# Patient Record
Sex: FEMALE | Race: WHITE | NOT HISPANIC OR LATINO | Employment: FULL TIME | URBAN - METROPOLITAN AREA
[De-identification: names, ages, dates, MRNs, and addresses within clinical notes are randomized per-mention and may not be internally consistent; named-entity substitution may affect disease eponyms.]

---
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
## 2018-09-17 RX ORDER — NORETHINDRONE ACETATE AND ETHINYL ESTRADIOL 1MG-20(24)
KIT ORAL
Qty: 28 TABLET | Refills: 5 | Status: SHIPPED | OUTPATIENT
Start: 2018-09-17 | End: 2018-12-13 | Stop reason: SDUPTHER

## 2018-12-14 RX ORDER — NORETHINDRONE ACETATE AND ETHINYL ESTRADIOL 1MG-20(24)
KIT ORAL
Qty: 28 TABLET | Refills: 3 | Status: SHIPPED | OUTPATIENT
Start: 2018-12-14 | End: 2019-03-05 | Stop reason: SDUPTHER

## 2019-03-06 RX ORDER — NORETHINDRONE ACETATE AND ETHINYL ESTRADIOL 1MG-20(24)
KIT ORAL
Qty: 28 TABLET | Refills: 0 | Status: SHIPPED | OUTPATIENT
Start: 2019-03-06 | End: 2019-03-31 | Stop reason: SDUPTHER

## 2019-04-01 RX ORDER — NORETHINDRONE ACETATE AND ETHINYL ESTRADIOL 1MG-20(24)
KIT ORAL
Qty: 28 TABLET | Refills: 0 | Status: SHIPPED | OUTPATIENT
Start: 2019-04-01 | End: 2019-04-25 | Stop reason: SDUPTHER

## 2019-04-25 RX ORDER — NORETHINDRONE ACETATE AND ETHINYL ESTRADIOL 1MG-20(24)
KIT ORAL
Qty: 28 TABLET | Refills: 0 | Status: SHIPPED | OUTPATIENT
Start: 2019-04-25 | End: 2019-05-15 | Stop reason: SDUPTHER

## 2019-05-16 RX ORDER — NORETHINDRONE ACETATE AND ETHINYL ESTRADIOL 1MG-20(24)
KIT ORAL
Qty: 28 TABLET | Refills: 0 | Status: SHIPPED | OUTPATIENT
Start: 2019-05-16 | End: 2019-05-30 | Stop reason: SDUPTHER

## 2019-05-30 ENCOUNTER — OFFICE VISIT (OUTPATIENT)
Dept: OBSTETRICS AND GYNECOLOGY | Facility: CLINIC | Age: 19
End: 2019-05-30
Attending: STUDENT IN AN ORGANIZED HEALTH CARE EDUCATION/TRAINING PROGRAM
Payer: COMMERCIAL

## 2019-05-30 VITALS
BODY MASS INDEX: 18.28 KG/M2 | SYSTOLIC BLOOD PRESSURE: 110 MMHG | WEIGHT: 113.75 LBS | HEIGHT: 66 IN | DIASTOLIC BLOOD PRESSURE: 66 MMHG

## 2019-05-30 DIAGNOSIS — Z00.129 ENCOUNTER FOR WELL ADOLESCENT VISIT: Primary | ICD-10-CM

## 2019-05-30 PROCEDURE — 3008F BODY MASS INDEX DOCD: CPT | Mod: CPTII,S$GLB,, | Performed by: STUDENT IN AN ORGANIZED HEALTH CARE EDUCATION/TRAINING PROGRAM

## 2019-05-30 PROCEDURE — 99395 PR PREVENTIVE VISIT,EST,18-39: ICD-10-PCS | Mod: S$GLB,,, | Performed by: STUDENT IN AN ORGANIZED HEALTH CARE EDUCATION/TRAINING PROGRAM

## 2019-05-30 PROCEDURE — 99999 PR PBB SHADOW E&M-EST. PATIENT-LVL III: ICD-10-PCS | Mod: PBBFAC,,, | Performed by: STUDENT IN AN ORGANIZED HEALTH CARE EDUCATION/TRAINING PROGRAM

## 2019-05-30 PROCEDURE — 3008F PR BODY MASS INDEX (BMI) DOCUMENTED: ICD-10-PCS | Mod: CPTII,S$GLB,, | Performed by: STUDENT IN AN ORGANIZED HEALTH CARE EDUCATION/TRAINING PROGRAM

## 2019-05-30 PROCEDURE — 99395 PREV VISIT EST AGE 18-39: CPT | Mod: S$GLB,,, | Performed by: STUDENT IN AN ORGANIZED HEALTH CARE EDUCATION/TRAINING PROGRAM

## 2019-05-30 PROCEDURE — 99999 PR PBB SHADOW E&M-EST. PATIENT-LVL III: CPT | Mod: PBBFAC,,, | Performed by: STUDENT IN AN ORGANIZED HEALTH CARE EDUCATION/TRAINING PROGRAM

## 2019-05-30 NOTE — PROGRESS NOTES
Chief Complaint: Adolescent visit     HPI:      Simi Reaves 18 y.o.  presents to establish care.  Patient's last menstrual period was 2019 (approximate). Patient reports menses are regular. She denies heavy, painful periods.  Started on OCPs last year for dysmenorrhea and reports much improvement.  No side effects.  No other issues or concerns.  She uses tampons. She has never been sexually active. She is currently going to school - just finished marta year at [employer].     OB History        0    Para   0    Term   0       0    AB   0    Living   0       SAB   0    TAB   0    Ectopic   0    Multiple   0    Live Births   0               History reviewed. No pertinent past medical history.  Past Surgical History:   Procedure Laterality Date    TONSILLECTOMY       Social History     Socioeconomic History    Marital status: Single     Spouse name: Not on file    Number of children: Not on file    Years of education: Not on file    Highest education level: Not on file   Occupational History    Not on file   Social Needs    Financial resource strain: Not on file    Food insecurity:     Worry: Not on file     Inability: Not on file    Transportation needs:     Medical: Not on file     Non-medical: Not on file   Tobacco Use    Smoking status: Never Smoker    Smokeless tobacco: Never Used   Substance and Sexual Activity    Alcohol use: Yes     Comment: rarely    Drug use: Never    Sexual activity: Never   Lifestyle    Physical activity:     Days per week: Not on file     Minutes per session: Not on file    Stress: Not on file   Relationships    Social connections:     Talks on phone: Not on file     Gets together: Not on file     Attends Sabianist service: Not on file     Active member of club or organization: Not on file     Attends meetings of clubs or organizations: Not on file     Relationship status: Not on file   Other Topics Concern    Not on file   Social History  "Narrative    Not on file     Family History   Problem Relation Age of Onset    Colon cancer Neg Hx     Ovarian cancer Neg Hx     Breast cancer Neg Hx        Current Outpatient Medications:     BLISOVI 24 FE 1 mg-20 mcg (24)/75 mg (4) per tablet, TAKE 1 TABLET BY MOUTH ONCE DAILY., Disp: 28 tablet, Rfl: 0    ROS:     GENERAL: Denies unintentional weight gain or weight loss. Feeling well overall.   SKIN: Denies rash or lesions.   HEENT: Denies headaches, or vision changes.   CARDIOVASCULAR: Denies palpitations or chest pain.   RESPIRATORY: Denies shortness of breath or dyspnea on exertion.  BREASTS: Denies pain, lumps, or nipple discharge.   ABDOMEN: Denies abdominal pain, constipation, diarrhea, nausea, vomiting, change in appetite.  URINARY: Denies frequency, dysuria, hematuria.  NEUROLOGIC: Denies syncope or weakness.   PSYCHIATRIC: Denies depression, anxiety or mood swings.    Physical Exam:      /66   Ht 5' 6" (1.676 m)   Wt 51.6 kg (113 lb 12.1 oz)   LMP 05/01/2019 (Approximate)   BMI 18.36 kg/m²   Body mass index is 18.36 kg/m².    APPEARANCE: Well nourished, well developed, in no acute distress.  PSYCH: Appropriate mood and affect  EXTREMITIES: No edema.     Assessment/Plan:     Encounter for well adolescent visit    Other orders  -     norethindrone-e.estradiol-iron (BLISOVI 24 FE) 1 mg-20 mcg (24)/75 mg (4) Oral per tablet; Take 1 tablet by mouth once daily.  Dispense: 28 tablet; Refill: 11        Counseling:     Normal female anatomy and normal anatomy variations discussed at length.   Normal sexuality discussed.   Condoms as a method of protection against STDs and appropriate condom use were discussed.    The risks of, benefits of, and alternatives of various forms of contraception were discussed at this visit. After a discussion of the R/B/A of fertility awareness, barrier contraception, hormonal pills, injections, patches, rings, hormonal and non-hormonal IUDs, and the subdermal implant, " all of Simi Reaves's questions were answered. Plan B for emergency contraception was also reviewed.     She would like to continue OCPs for dysmenorrhea.  R/B/A reviewed and all questions answered.  Discussed pap smear and exam at next visit.  All questions answered.  RTC in 1 year or sooner if needed.     15 minutes of face to face counseling occurred during today's visit.

## 2019-09-02 PROBLEM — Z00.129 ENCOUNTER FOR WELL ADOLESCENT VISIT: Status: RESOLVED | Noted: 2019-05-30 | Resolved: 2019-09-02

## 2020-05-13 ENCOUNTER — LAB VISIT (OUTPATIENT)
Dept: PRIMARY CARE CLINIC | Facility: CLINIC | Age: 20
End: 2020-05-13
Payer: COMMERCIAL

## 2020-05-13 DIAGNOSIS — Z00.6 RESEARCH STUDY PATIENT: Primary | ICD-10-CM

## 2020-05-13 PROCEDURE — U0002 COVID-19 LAB TEST NON-CDC: HCPCS

## 2020-05-13 PROCEDURE — 86769 SARS-COV-2 COVID-19 ANTIBODY: CPT

## 2020-05-13 RX ORDER — NORETHINDRONE ACETATE AND ETHINYL ESTRADIOL 1MG-20(24)
KIT ORAL
Qty: 84 TABLET | Refills: 0 | Status: SHIPPED | OUTPATIENT
Start: 2020-05-13 | End: 2020-08-14 | Stop reason: SDUPTHER

## 2020-05-13 NOTE — RESEARCH
Date of Consent: 05/13/2020     Sponsor: [facility]    Study Title/IRB Number: Observational study of Sars-CoV2 Immunoglobulin G (IgG) seroprevalence among the [facility] population over time 2020.163  Principle Investigator: Kady Pat, PhD    Did the patient need translation services? No   name: N/A    Prior to the Informed Consent (IC) being signed, or any study protocol required data collection, testing, procedure, or intervention being performed, the following was done and/or discussed:   Patient was given a paper copy of the IC for review    Patient was given study FAQ   Purpose of the study and qualifications to participate    Study design and tests or procedures done at this visit   Confidentiality and HIPAA Authorization for Release of Medical Records for the research trial/ subject's rights/research related injury   Risk, Benefits, Alternative Treatments, Compensation and Costs   Participation in the research trial is voluntary and patient may withdraw at anytime   Contact information for study related questions    Patient verbalizes understanding of the above: Yes  Contact information for PI and IRB given to patient: Yes  Patient able to adequately summarize: the purpose of the study, the risks associated with the study, and all procedures, testing, and follow-ups associated with the study: Yes    The consent was discussed verbally with the patient and all questions were answered satisfactorily. Patient gave verbal consent for the Seroprevalence research study with an IRB approval date of 05/08/2020.      The Consent, Consent Witness and name of Clinical Research Coordinator consenting was captured and documented in [employer].    All Inclusion and Exclusion Criteria reviewed, subject meets all Inclusion criteria and does not meet any Exclusion Criteria at this time.     Patient Eligibility was confirmed.    Patient responded to survey questions.    The following biospecimen  collection procedures were collected:    -Nasopharyngeal Swab Collection  -Blood collection

## 2020-05-14 LAB — SARS-COV-2 IGG SERPLBLD QL IA.RAPID: NEGATIVE

## 2020-05-15 LAB — SARS-COV-2 RNA RESP QL NAA+PROBE: NOT DETECTED

## 2020-10-12 ENCOUNTER — OFFICE VISIT (OUTPATIENT)
Dept: OBSTETRICS AND GYNECOLOGY | Facility: CLINIC | Age: 20
End: 2020-10-12
Attending: STUDENT IN AN ORGANIZED HEALTH CARE EDUCATION/TRAINING PROGRAM
Payer: COMMERCIAL

## 2020-10-12 VITALS
HEIGHT: 66 IN | SYSTOLIC BLOOD PRESSURE: 122 MMHG | BODY MASS INDEX: 18.58 KG/M2 | DIASTOLIC BLOOD PRESSURE: 72 MMHG | WEIGHT: 115.63 LBS

## 2020-10-12 DIAGNOSIS — Z01.419 WELL WOMAN EXAM: ICD-10-CM

## 2020-10-12 DIAGNOSIS — Z12.4 SCREENING FOR CERVICAL CANCER: Primary | ICD-10-CM

## 2020-10-12 PROCEDURE — 88175 CYTOPATH C/V AUTO FLUID REDO: CPT

## 2020-10-12 PROCEDURE — 99999 PR PBB SHADOW E&M-EST. PATIENT-LVL III: ICD-10-PCS | Mod: PBBFAC,,, | Performed by: STUDENT IN AN ORGANIZED HEALTH CARE EDUCATION/TRAINING PROGRAM

## 2020-10-12 PROCEDURE — 99999 PR PBB SHADOW E&M-EST. PATIENT-LVL III: CPT | Mod: PBBFAC,,, | Performed by: STUDENT IN AN ORGANIZED HEALTH CARE EDUCATION/TRAINING PROGRAM

## 2020-10-12 PROCEDURE — 99395 PR PREVENTIVE VISIT,EST,18-39: ICD-10-PCS | Mod: S$GLB,,, | Performed by: STUDENT IN AN ORGANIZED HEALTH CARE EDUCATION/TRAINING PROGRAM

## 2020-10-12 PROCEDURE — 99395 PREV VISIT EST AGE 18-39: CPT | Mod: S$GLB,,, | Performed by: STUDENT IN AN ORGANIZED HEALTH CARE EDUCATION/TRAINING PROGRAM

## 2020-10-12 RX ORDER — NORETHINDRONE ACETATE AND ETHINYL ESTRADIOL 1MG-20(24)
1 KIT ORAL DAILY
Qty: 84 TABLET | Refills: 3 | Status: SHIPPED | OUTPATIENT
Start: 2020-10-12 | End: 2021-10-26

## 2020-10-12 NOTE — PROGRESS NOTES
Chief Complaint: Well Woman Exam     HPI:      Simi Reaves is a 20 y.o.  who presents today for well woman exam.  LMP: Patient's last menstrual period was 2020 (approximate).  No issues, problems, or complaints. Specifically, patient denies abnormal vaginal bleeding, discharge, pelvic pain, urinary problems, or changes in appetite. Ms. Reaves is currently sexually active with a single male partner. She is currently using oral contraceptives (estrogen/progesterone) for contraception. She declines STD screening today.    Previous Pap: No result found     OB History        0    Para   0    Term   0       0    AB   0    Living   0       SAB   0    TAB   0    Ectopic   0    Multiple   0    Live Births   0           Obstetric Comments   Menarche age 10           History reviewed. No pertinent past medical history.  Past Surgical History:   Procedure Laterality Date    LASIK  2020    TONSILLECTOMY       Social History     Socioeconomic History    Marital status: Single     Spouse name: Not on file    Number of children: Not on file    Years of education: Not on file    Highest education level: Not on file   Occupational History    Not on file   Social Needs    Financial resource strain: Not on file    Food insecurity     Worry: Not on file     Inability: Not on file    Transportation needs     Medical: Not on file     Non-medical: Not on file   Tobacco Use    Smoking status: Never Smoker    Smokeless tobacco: Never Used   Substance and Sexual Activity    Alcohol use: Yes     Comment: rarely    Drug use: Never    Sexual activity: Not Currently     Birth control/protection: OCP   Lifestyle    Physical activity     Days per week: Not on file     Minutes per session: Not on file    Stress: Not on file   Relationships    Social connections     Talks on phone: Not on file     Gets together: Not on file     Attends Confucianism service: Not on file     Active member  "of club or organization: Not on file     Attends meetings of clubs or organizations: Not on file     Relationship status: Not on file   Other Topics Concern    Not on file   Social History Narrative    Not on file     Family History   Problem Relation Age of Onset    Colon cancer Neg Hx     Ovarian cancer Neg Hx     Breast cancer Neg Hx        Current Outpatient Medications:     norethindrone-e.estradioL-iron (BLISOVI 24 FE) 1 mg-20 mcg (24)/75 mg (4) per tablet, Take 1 tablet by mouth once daily., Disp: 84 tablet, Rfl: 3    ROS:     GENERAL: Denies unintentional weight gain or weight loss. Feeling well overall.   SKIN: Denies rash or lesions.   HEENT: Denies headaches, or vision changes.   CARDIOVASCULAR: Denies palpitations or chest pain.   RESPIRATORY: Denies shortness of breath or dyspnea on exertion.  BREASTS: Denies pain, lumps, or nipple discharge.   ABDOMEN: Denies abdominal pain, constipation, diarrhea, nausea, vomiting, change in appetite.  URINARY: Denies frequency, dysuria, hematuria.  NEUROLOGIC: Denies syncope or weakness.   PSYCHIATRIC: Denies depression, anxiety or mood swings.    Physical Exam:      PHYSICAL EXAM:  /72   Ht 5' 6" (1.676 m)   Wt 52.4 kg (115 lb 10.1 oz)   LMP 09/28/2020 (Approximate)   BMI 18.66 kg/m²   Body mass index is 18.66 kg/m².     APPEARANCE: Well nourished, well developed, in no acute distress.  PSYCH: Appropriate mood and affect.  SKIN: No acne or hirsutism.  NECK: Neck symmetric without masses or thyromegaly.  NODES: No inguinal, axillary, or supraclavicular lymph node enlargement.  CHEST: Normal respiratory effort.    CARDIOVASCULAR:  Regular rate and rhythm.  BREASTS: Symmetrical, no skin changes or visible lesions.  No palpable masses or nipple discharge bilaterally.  ABDOMEN: Soft.  No tenderness or masses.    PELVIC: Normal external genitalia without lesions.  Normal hair distribution.  Adequate perineal body, normal urethral meatus.  Vagina moist and " well rugated without lesions or discharge.  Cervix pink, without lesions, discharge or tenderness.  No significant cystocele or rectocele.  Bimanual exam shows uterus to be normal size, regular, mobile and nontender.  Adnexa without masses or tenderness.    EXTREMITIES: No edema.  No tenderness to palpation.      Assessment/Plan:     20 y.o.     Screening for cervical cancer  -     Liquid-Based Pap Smear, Screening    Well woman exam    Other orders  -     norethindrone-e.estradioL-iron (BLISOVI 24 FE) 1 mg-20 mcg (24)/75 mg (4) per tablet; Take 1 tablet by mouth once daily.  Dispense: 84 tablet; Refill: 3          Counselin.  Annual exam performed today without difficulty.  Patient was counseled today on current ASCCP pap guidelines, the recommendation for yearly pelvic exams, healthy diet and exercise routines, breast self awareness.  Pap smear collected.  All questions answered.    2.  Contraception:  Desires to continue OCPs.  R/B/A reviewed including but not limited to risk of cramping, irregular bleeding, nausea, bloating, breast tenderness, headache, stroke, blood clot, heart attack.  Patient voiced understanding and desires to proceed with treatment.  3.  STD testing:  Declines.  4.  Follow up with PCP for other health maintenance.  5.  Follow up in about 1 year (around 10/12/2021).      Use of the [employer] [facility] Patient Portal discussed and encouraged during today's visit.

## 2020-11-06 ENCOUNTER — PATIENT MESSAGE (OUTPATIENT)
Dept: OBSTETRICS AND GYNECOLOGY | Facility: CLINIC | Age: 20
End: 2020-11-06

## 2020-11-06 LAB
FINAL PATHOLOGIC DIAGNOSIS: NORMAL
Lab: NORMAL

## 2021-10-26 RX ORDER — NORETHINDRONE ACETATE AND ETHINYL ESTRADIOL 1MG-20(24)
KIT ORAL
Qty: 84 TABLET | Refills: 0 | Status: SHIPPED | OUTPATIENT
Start: 2021-10-26 | End: 2022-01-20

## 2022-02-21 ENCOUNTER — OFFICE VISIT (OUTPATIENT)
Dept: OBSTETRICS AND GYNECOLOGY | Facility: CLINIC | Age: 22
End: 2022-02-21
Attending: STUDENT IN AN ORGANIZED HEALTH CARE EDUCATION/TRAINING PROGRAM
Payer: COMMERCIAL

## 2022-02-21 VITALS
DIASTOLIC BLOOD PRESSURE: 74 MMHG | SYSTOLIC BLOOD PRESSURE: 120 MMHG | HEIGHT: 66 IN | WEIGHT: 114 LBS | BODY MASS INDEX: 18.32 KG/M2

## 2022-02-21 DIAGNOSIS — Z01.419 WELL WOMAN EXAM: ICD-10-CM

## 2022-02-21 DIAGNOSIS — Z30.9 ENCOUNTER FOR CONTRACEPTIVE MANAGEMENT, UNSPECIFIED TYPE: Primary | ICD-10-CM

## 2022-02-21 LAB
B-HCG UR QL: NEGATIVE
CTP QC/QA: YES

## 2022-02-21 PROCEDURE — 81025 POCT URINE PREGNANCY: ICD-10-PCS | Mod: S$GLB,,, | Performed by: STUDENT IN AN ORGANIZED HEALTH CARE EDUCATION/TRAINING PROGRAM

## 2022-02-21 PROCEDURE — 99999 PR PBB SHADOW E&M-EST. PATIENT-LVL III: ICD-10-PCS | Mod: PBBFAC,,, | Performed by: STUDENT IN AN ORGANIZED HEALTH CARE EDUCATION/TRAINING PROGRAM

## 2022-02-21 PROCEDURE — 3074F PR MOST RECENT SYSTOLIC BLOOD PRESSURE < 130 MM HG: ICD-10-PCS | Mod: CPTII,S$GLB,, | Performed by: STUDENT IN AN ORGANIZED HEALTH CARE EDUCATION/TRAINING PROGRAM

## 2022-02-21 PROCEDURE — 99395 PREV VISIT EST AGE 18-39: CPT | Mod: S$GLB,,, | Performed by: STUDENT IN AN ORGANIZED HEALTH CARE EDUCATION/TRAINING PROGRAM

## 2022-02-21 PROCEDURE — 3078F DIAST BP <80 MM HG: CPT | Mod: CPTII,S$GLB,, | Performed by: STUDENT IN AN ORGANIZED HEALTH CARE EDUCATION/TRAINING PROGRAM

## 2022-02-21 PROCEDURE — 3074F SYST BP LT 130 MM HG: CPT | Mod: CPTII,S$GLB,, | Performed by: STUDENT IN AN ORGANIZED HEALTH CARE EDUCATION/TRAINING PROGRAM

## 2022-02-21 PROCEDURE — 99395 PR PREVENTIVE VISIT,EST,18-39: ICD-10-PCS | Mod: S$GLB,,, | Performed by: STUDENT IN AN ORGANIZED HEALTH CARE EDUCATION/TRAINING PROGRAM

## 2022-02-21 PROCEDURE — 3008F BODY MASS INDEX DOCD: CPT | Mod: CPTII,S$GLB,, | Performed by: STUDENT IN AN ORGANIZED HEALTH CARE EDUCATION/TRAINING PROGRAM

## 2022-02-21 PROCEDURE — 3078F PR MOST RECENT DIASTOLIC BLOOD PRESSURE < 80 MM HG: ICD-10-PCS | Mod: CPTII,S$GLB,, | Performed by: STUDENT IN AN ORGANIZED HEALTH CARE EDUCATION/TRAINING PROGRAM

## 2022-02-21 PROCEDURE — 1159F MED LIST DOCD IN RCRD: CPT | Mod: CPTII,S$GLB,, | Performed by: STUDENT IN AN ORGANIZED HEALTH CARE EDUCATION/TRAINING PROGRAM

## 2022-02-21 PROCEDURE — 81025 URINE PREGNANCY TEST: CPT | Mod: S$GLB,,, | Performed by: STUDENT IN AN ORGANIZED HEALTH CARE EDUCATION/TRAINING PROGRAM

## 2022-02-21 PROCEDURE — 1159F PR MEDICATION LIST DOCUMENTED IN MEDICAL RECORD: ICD-10-PCS | Mod: CPTII,S$GLB,, | Performed by: STUDENT IN AN ORGANIZED HEALTH CARE EDUCATION/TRAINING PROGRAM

## 2022-02-21 PROCEDURE — 3008F PR BODY MASS INDEX (BMI) DOCUMENTED: ICD-10-PCS | Mod: CPTII,S$GLB,, | Performed by: STUDENT IN AN ORGANIZED HEALTH CARE EDUCATION/TRAINING PROGRAM

## 2022-02-21 PROCEDURE — 99999 PR PBB SHADOW E&M-EST. PATIENT-LVL III: CPT | Mod: PBBFAC,,, | Performed by: STUDENT IN AN ORGANIZED HEALTH CARE EDUCATION/TRAINING PROGRAM

## 2022-02-28 RX ORDER — NORETHINDRONE ACETATE AND ETHINYL ESTRADIOL 1MG-20(24)
1 KIT ORAL DAILY
Qty: 84 TABLET | Refills: 4 | Status: SHIPPED | OUTPATIENT
Start: 2022-02-28 | End: 2023-05-05

## 2022-02-28 NOTE — PROGRESS NOTES
Chief Complaint: Well Woman Exam     HPI:      Simi Reaves is a 21 y.o.  who presents today for well woman exam.  LMP: Patient's last menstrual period was 2022.  No issues, problems, or complaints. Specifically, patient denies abnormal vaginal bleeding, discharge, pelvic pain, urinary problems, or changes in appetite. Ms. Reaves is currently sexually active with a single male partner. She is currently using oral contraceptives (estrogen/progesterone) for contraception. She declines STD screening today.    Previous Pap: 2020 NEM    OB History        0    Para   0    Term   0       0    AB   0    Living   0       SAB   0    IAB   0    Ectopic   0    Multiple   0    Live Births   0           Obstetric Comments   Menarche age 10           History reviewed. No pertinent past medical history.  Past Surgical History:   Procedure Laterality Date    LASIK  2020    TONSILLECTOMY       Social History     Socioeconomic History    Marital status: Single   Tobacco Use    Smoking status: Never Smoker    Smokeless tobacco: Never Used   Substance and Sexual Activity    Alcohol use: Yes     Comment: rarely    Drug use: Never    Sexual activity: Not Currently     Birth control/protection: OCP     Family History   Problem Relation Age of Onset    Colon cancer Neg Hx     Ovarian cancer Neg Hx     Breast cancer Neg Hx        Current Outpatient Medications:     norethindrone-e.estradioL-iron (BLISOVI 24 FE) 1 mg-20 mcg (24)/75 mg (4) per tablet, Take 1 tablet by mouth once daily., Disp: 84 tablet, Rfl: 4    ROS:     GENERAL: Denies unintentional weight gain or weight loss. Feeling well overall.   SKIN: Denies rash or lesions.   HEENT: Denies headaches, or vision changes.   CARDIOVASCULAR: Denies palpitations or chest pain.   RESPIRATORY: Denies shortness of breath or dyspnea on exertion.  BREASTS: Denies pain, lumps, or nipple discharge.   ABDOMEN: Denies abdominal pain,  "constipation, diarrhea, nausea, vomiting, change in appetite.  URINARY: Denies frequency, dysuria, hematuria.  NEUROLOGIC: Denies syncope or weakness.   PSYCHIATRIC: Denies depression, anxiety or mood swings.    Physical Exam:      PHYSICAL EXAM:  /74   Ht 5' 6" (1.676 m)   Wt 51.7 kg (113 lb 15.7 oz)   LMP 2022   BMI 18.40 kg/m²   Body mass index is 18.4 kg/m².     APPEARANCE: Well nourished, well developed, in no acute distress.  PSYCH: Appropriate mood and affect.  SKIN: No acne or hirsutism.  NECK: Neck symmetric without masses or thyromegaly.  NODES: No inguinal, axillary, or supraclavicular lymph node enlargement.  CHEST: Normal respiratory effort.    CARDIOVASCULAR:  Regular rate and rhythm.  BREASTS: Symmetrical, no skin changes or visible lesions.  No palpable masses or nipple discharge bilaterally.  ABDOMEN: Soft.  No tenderness or masses.    PELVIC: Normal external genitalia without lesions.  Normal hair distribution.  Adequate perineal body, normal urethral meatus.  Vagina moist and well rugated without lesions or discharge.  Cervix pink, without lesions, discharge or tenderness.  No significant cystocele or rectocele.  Bimanual exam shows uterus to be normal size, regular, mobile and nontender.  Adnexa without masses or tenderness.    EXTREMITIES: No edema.  No tenderness to palpation.      Assessment/Plan:     21 y.o.     Encounter for contraceptive management, unspecified type  -     POCT urine pregnancy    Well woman exam    Other orders  -     norethindrone-e.estradioL-iron (BLISOVI 24 FE) 1 mg-20 mcg (24)/75 mg (4) per tablet; Take 1 tablet by mouth once daily.  Dispense: 84 tablet; Refill: 4          Counselin.  Annual exam performed today without difficulty.  Patient was counseled today on current ASCCP pap guidelines, the recommendation for yearly pelvic exams, healthy diet and exercise routines, breast self awareness.  Pap smear collected.  All questions answered. "    2.  Contraception:  Desires to continue OCPs.  R/B/A reviewed including but not limited to risk of cramping, irregular bleeding, nausea, bloating, breast tenderness, headache, stroke, blood clot, heart attack.  Patient voiced understanding and desires to proceed with treatment.  3.  Follow up with PCP for other health maintenance.  4.  Follow up in about 1 year (around 2/21/2023).      Use of the [employer] Patient Portal discussed and encouraged during today's visit.

## 2022-10-11 ENCOUNTER — OFFICE VISIT (OUTPATIENT)
Dept: PRIMARY CARE CLINIC | Facility: CLINIC | Age: 22
End: 2022-10-11
Payer: COMMERCIAL

## 2022-10-11 DIAGNOSIS — R68.84 JAW PAIN: Primary | ICD-10-CM

## 2022-10-11 PROCEDURE — 1159F PR MEDICATION LIST DOCUMENTED IN MEDICAL RECORD: ICD-10-PCS | Mod: CPTII,95,, | Performed by: STUDENT IN AN ORGANIZED HEALTH CARE EDUCATION/TRAINING PROGRAM

## 2022-10-11 PROCEDURE — 1160F PR REVIEW ALL MEDS BY PRESCRIBER/CLIN PHARMACIST DOCUMENTED: ICD-10-PCS | Mod: CPTII,95,, | Performed by: STUDENT IN AN ORGANIZED HEALTH CARE EDUCATION/TRAINING PROGRAM

## 2022-10-11 PROCEDURE — 99213 PR OFFICE/OUTPT VISIT, EST, LEVL III, 20-29 MIN: ICD-10-PCS | Mod: 95,,, | Performed by: STUDENT IN AN ORGANIZED HEALTH CARE EDUCATION/TRAINING PROGRAM

## 2022-10-11 PROCEDURE — 99213 OFFICE O/P EST LOW 20 MIN: CPT | Mod: 95,,, | Performed by: STUDENT IN AN ORGANIZED HEALTH CARE EDUCATION/TRAINING PROGRAM

## 2022-10-11 PROCEDURE — 1159F MED LIST DOCD IN RCRD: CPT | Mod: CPTII,95,, | Performed by: STUDENT IN AN ORGANIZED HEALTH CARE EDUCATION/TRAINING PROGRAM

## 2022-10-11 PROCEDURE — 1160F RVW MEDS BY RX/DR IN RCRD: CPT | Mod: CPTII,95,, | Performed by: STUDENT IN AN ORGANIZED HEALTH CARE EDUCATION/TRAINING PROGRAM

## 2022-10-11 RX ORDER — PREDNISONE 10 MG/1
TABLET ORAL
Qty: 7 TABLET | Refills: 0 | Status: SHIPPED | OUTPATIENT
Start: 2022-10-11

## 2022-10-11 RX ORDER — MELOXICAM 15 MG/1
15 TABLET ORAL DAILY
Qty: 30 TABLET | Refills: 0 | Status: SHIPPED | OUTPATIENT
Start: 2022-10-11 | End: 2023-07-19 | Stop reason: ALTCHOICE

## 2022-10-11 NOTE — PROGRESS NOTES
The patient location is: [facility]  The chief complaint leading to consultation is: jaw pain    Visit type: audiovisual    Face to Face time with patient: 15  15 minutes of total time spent on the encounter, which includes face to face time and non-face to face time preparing to see the patient (eg, review of tests), Obtaining and/or reviewing separately obtained history, Documenting clinical information in the electronic or other health record, Independently interpreting results (not separately reported) and communicating results to the patient/family/caregiver, or Care coordination (not separately reported).         Each patient to whom he or she provides medical services by telemedicine is:  (1) informed of the relationship between the physician and patient and the respective role of any other health care provider with respect to management of the patient; and (2) notified that he or she may decline to receive medical services by telemedicine and may withdraw from such care at any time.    Notes:     Left sided jaw pain.  Has been feeling tighter, but is not locking.   Saturday was having pain on opening had has been getting worse since.     States that in May had been noticing her jaw popping and clicking before.    Denies any dental concerns.    States that she clenches her jaw when anxious.    Has tried use of ibuprofen so far for the pain. Has not been helpful.      Hx of wisdom teeth out year ago.  Denies any fever so chills.  No swelling of the parotids.       Physical Exam  Constitutional:       General: She is not in acute distress.     Appearance: Normal appearance.   HENT:      Right Ear: External ear normal.      Left Ear: External ear normal.      Nose: No rhinorrhea.   Eyes:      Extraocular Movements: Extraocular movements intact.   Pulmonary:      Effort: Pulmonary effort is normal. No respiratory distress.   Neurological:      Mental Status: She is alert and oriented to person, place, and time.    Psychiatric:         Mood and Affect: Mood normal.         Behavior: Behavior normal.       Jaw Pain:   - patient with 4 days of progressive jaw pain.   - worse with chewing or movement.   - no tooth pain or gum swelling.   - no fever or chills.   - advised this is often transient.  Avoid gum or chewing ice.   - use NSAID for 2 weeks daily.    - short burst of stroids.   - notify clinic if not getting better.

## 2022-10-11 NOTE — PATIENT INSTRUCTIONS
Jaw Pain:   - patient with 4 days of progressive jaw pain.   - worse with chewing or movement.   - no tooth pain or gum swelling.   - no fever or chills.   - advised this is often transient.  Avoid gum or chewing ice.   - use NSAID for 2 weeks daily.    - short burst of stroids.   - notify clinic if not getting better.      The defib pads were placed on the patient's back and chest.

## 2022-10-19 ENCOUNTER — PATIENT MESSAGE (OUTPATIENT)
Dept: PRIMARY CARE CLINIC | Facility: CLINIC | Age: 22
End: 2022-10-19
Payer: COMMERCIAL

## 2023-02-07 ENCOUNTER — PATIENT MESSAGE (OUTPATIENT)
Dept: RHEUMATOLOGY | Facility: CLINIC | Age: 23
End: 2023-02-07
Payer: COMMERCIAL

## 2023-02-14 ENCOUNTER — LAB VISIT (OUTPATIENT)
Dept: LAB | Facility: HOSPITAL | Age: 23
End: 2023-02-14
Attending: INTERNAL MEDICINE
Payer: COMMERCIAL

## 2023-02-14 ENCOUNTER — OFFICE VISIT (OUTPATIENT)
Dept: RHEUMATOLOGY | Facility: CLINIC | Age: 23
End: 2023-02-14
Payer: COMMERCIAL

## 2023-02-14 ENCOUNTER — HOSPITAL ENCOUNTER (OUTPATIENT)
Dept: RADIOLOGY | Facility: HOSPITAL | Age: 23
Discharge: HOME | End: 2023-02-14
Attending: INTERNAL MEDICINE
Payer: COMMERCIAL

## 2023-02-14 VITALS
BODY MASS INDEX: 18.4 KG/M2 | WEIGHT: 114.5 LBS | HEIGHT: 66 IN | OXYGEN SATURATION: 100 % | SYSTOLIC BLOOD PRESSURE: 122 MMHG | HEART RATE: 85 BPM | DIASTOLIC BLOOD PRESSURE: 72 MMHG

## 2023-02-14 DIAGNOSIS — Z71.89 COUNSELING AND COORDINATION OF CARE: ICD-10-CM

## 2023-02-14 DIAGNOSIS — M25.50 ARTHRALGIA, UNSPECIFIED JOINT: ICD-10-CM

## 2023-02-14 DIAGNOSIS — M25.50 ARTHRALGIA, UNSPECIFIED JOINT: Primary | ICD-10-CM

## 2023-02-14 LAB
25(OH)D3+25(OH)D2 SERPL-MCNC: 23 NG/ML (ref 30–96)
ALBUMIN SERPL BCP-MCNC: 4.1 G/DL (ref 3.5–5.2)
ALP SERPL-CCNC: 34 U/L (ref 55–135)
ALT SERPL W/O P-5'-P-CCNC: 5 U/L (ref 10–44)
ANION GAP SERPL CALC-SCNC: 10 MMOL/L (ref 8–16)
AST SERPL-CCNC: 17 U/L (ref 10–40)
BASOPHILS # BLD AUTO: 0.07 K/UL (ref 0–0.2)
BASOPHILS NFR BLD: 1 % (ref 0–1.9)
BILIRUB SERPL-MCNC: 0.3 MG/DL (ref 0.1–1)
BUN SERPL-MCNC: 13 MG/DL (ref 6–20)
C3 SERPL-MCNC: 105 MG/DL (ref 50–180)
C4 SERPL-MCNC: 17 MG/DL (ref 11–44)
CALCIUM SERPL-MCNC: 9.6 MG/DL (ref 8.7–10.5)
CCP AB SER IA-ACNC: 0.8 U/ML
CHLORIDE SERPL-SCNC: 106 MMOL/L (ref 95–110)
CK SERPL-CCNC: 85 U/L (ref 20–180)
CO2 SERPL-SCNC: 22 MMOL/L (ref 23–29)
CREAT SERPL-MCNC: 0.8 MG/DL (ref 0.5–1.4)
CRP SERPL-MCNC: 1.5 MG/L (ref 0–8.2)
DIFFERENTIAL METHOD: ABNORMAL
EOSINOPHIL # BLD AUTO: 0.1 K/UL (ref 0–0.5)
EOSINOPHIL NFR BLD: 1.2 % (ref 0–8)
ERYTHROCYTE [DISTWIDTH] IN BLOOD BY AUTOMATED COUNT: 13.5 % (ref 11.5–14.5)
ERYTHROCYTE [SEDIMENTATION RATE] IN BLOOD BY PHOTOMETRIC METHOD: 5 MM/HR (ref 0–36)
EST. GFR  (NO RACE VARIABLE): >60 ML/MIN/1.73 M^2
GLUCOSE SERPL-MCNC: 71 MG/DL (ref 70–110)
HBV SURFACE AB SER-ACNC: 200.48 MIU/ML
HBV SURFACE AB SER-ACNC: REACTIVE M[IU]/ML
HBV SURFACE AG SERPL QL IA: NORMAL
HCT VFR BLD AUTO: 33.5 % (ref 37–48.5)
HCV AB SERPL QL IA: NORMAL
HGB BLD-MCNC: 9.9 G/DL (ref 12–16)
HIV 1+2 AB+HIV1 P24 AG SERPL QL IA: NORMAL
IGA SERPL-MCNC: 137 MG/DL (ref 40–350)
IGG SERPL-MCNC: 936 MG/DL (ref 650–1600)
IGM SERPL-MCNC: 104 MG/DL (ref 50–300)
IMM GRANULOCYTES # BLD AUTO: 0.02 K/UL (ref 0–0.04)
IMM GRANULOCYTES NFR BLD AUTO: 0.3 % (ref 0–0.5)
LYMPHOCYTES # BLD AUTO: 2.4 K/UL (ref 1–4.8)
LYMPHOCYTES NFR BLD: 33.1 % (ref 18–48)
MCH RBC QN AUTO: 25.2 PG (ref 27–31)
MCHC RBC AUTO-ENTMCNC: 29.6 G/DL (ref 32–36)
MCV RBC AUTO: 85 FL (ref 82–98)
MONOCYTES # BLD AUTO: 0.5 K/UL (ref 0.3–1)
MONOCYTES NFR BLD: 6.6 % (ref 4–15)
NEUTROPHILS # BLD AUTO: 4.2 K/UL (ref 1.8–7.7)
NEUTROPHILS NFR BLD: 57.8 % (ref 38–73)
NRBC BLD-RTO: 0 /100 WBC
PLATELET # BLD AUTO: 339 K/UL (ref 150–450)
PMV BLD AUTO: 11.9 FL (ref 9.2–12.9)
POTASSIUM SERPL-SCNC: 3.4 MMOL/L (ref 3.5–5.1)
PROT SERPL-MCNC: 7.3 G/DL (ref 6–8.4)
RBC # BLD AUTO: 3.93 M/UL (ref 4–5.4)
SODIUM SERPL-SCNC: 138 MMOL/L (ref 136–145)
T4 FREE SERPL-MCNC: 1 NG/DL (ref 0.71–1.51)
TSH SERPL DL<=0.005 MIU/L-ACNC: 0.28 UIU/ML (ref 0.4–4)
URATE SERPL-MCNC: 2.9 MG/DL (ref 2.4–5.7)
WBC # BLD AUTO: 7.27 K/UL (ref 3.9–12.7)

## 2023-02-14 PROCEDURE — 82306 VITAMIN D 25 HYDROXY: CPT | Performed by: INTERNAL MEDICINE

## 2023-02-14 PROCEDURE — 1159F PR MEDICATION LIST DOCUMENTED IN MEDICAL RECORD: ICD-10-PCS | Mod: CPTII,S$GLB,, | Performed by: INTERNAL MEDICINE

## 2023-02-14 PROCEDURE — 83516 IMMUNOASSAY NONANTIBODY: CPT | Performed by: INTERNAL MEDICINE

## 2023-02-14 PROCEDURE — 3008F PR BODY MASS INDEX (BMI) DOCUMENTED: ICD-10-PCS | Mod: CPTII,S$GLB,, | Performed by: INTERNAL MEDICINE

## 2023-02-14 PROCEDURE — 84445 ASSAY OF TSI GLOBULIN: CPT | Performed by: INTERNAL MEDICINE

## 2023-02-14 PROCEDURE — 86803 HEPATITIS C AB TEST: CPT | Performed by: INTERNAL MEDICINE

## 2023-02-14 PROCEDURE — 86592 SYPHILIS TEST NON-TREP QUAL: CPT | Performed by: INTERNAL MEDICINE

## 2023-02-14 PROCEDURE — 77077 JOINT SURVEY SINGLE VIEW: CPT | Mod: TC

## 2023-02-14 PROCEDURE — 99999 PR PBB SHADOW E&M-EST. PATIENT-LVL IV: ICD-10-PCS | Mod: PBBFAC,,, | Performed by: INTERNAL MEDICINE

## 2023-02-14 PROCEDURE — 84165 PROTEIN E-PHORESIS SERUM: CPT | Mod: 26,,, | Performed by: PATHOLOGY

## 2023-02-14 PROCEDURE — 83529 ASAY OF INTERLEUKIN-6 (IL-6): CPT | Performed by: INTERNAL MEDICINE

## 2023-02-14 PROCEDURE — 1159F MED LIST DOCD IN RCRD: CPT | Mod: CPTII,S$GLB,, | Performed by: INTERNAL MEDICINE

## 2023-02-14 PROCEDURE — 82784 ASSAY IGA/IGD/IGG/IGM EACH: CPT | Mod: 59 | Performed by: INTERNAL MEDICINE

## 2023-02-14 PROCEDURE — 3074F PR MOST RECENT SYSTOLIC BLOOD PRESSURE < 130 MM HG: ICD-10-PCS | Mod: CPTII,S$GLB,, | Performed by: INTERNAL MEDICINE

## 2023-02-14 PROCEDURE — 84550 ASSAY OF BLOOD/URIC ACID: CPT | Performed by: INTERNAL MEDICINE

## 2023-02-14 PROCEDURE — 77077 XR ARTHRITIS SURVEY: ICD-10-PCS | Mod: 26,,, | Performed by: RADIOLOGY

## 2023-02-14 PROCEDURE — 72202 X-RAY EXAM SI JOINTS 3/> VWS: CPT | Mod: TC,FY

## 2023-02-14 PROCEDURE — 86334 IMMUNOFIX E-PHORESIS SERUM: CPT | Performed by: INTERNAL MEDICINE

## 2023-02-14 PROCEDURE — 86334 IMMUNOFIX E-PHORESIS SERUM: CPT | Mod: 26,,, | Performed by: PATHOLOGY

## 2023-02-14 PROCEDURE — 72202 XR SACROILIAC JOINTS COMPLETE: ICD-10-PCS | Mod: 26,,, | Performed by: RADIOLOGY

## 2023-02-14 PROCEDURE — 85025 COMPLETE CBC W/AUTO DIFF WBC: CPT | Performed by: INTERNAL MEDICINE

## 2023-02-14 PROCEDURE — 86200 CCP ANTIBODY: CPT | Performed by: INTERNAL MEDICINE

## 2023-02-14 PROCEDURE — 84165 PATHOLOGIST INTERPRETATION SPE: ICD-10-PCS | Mod: 26,,, | Performed by: PATHOLOGY

## 2023-02-14 PROCEDURE — 86160 COMPLEMENT ANTIGEN: CPT | Performed by: INTERNAL MEDICINE

## 2023-02-14 PROCEDURE — 82787 IGG 1 2 3 OR 4 EACH: CPT | Mod: 59 | Performed by: INTERNAL MEDICINE

## 2023-02-14 PROCEDURE — 3078F PR MOST RECENT DIASTOLIC BLOOD PRESSURE < 80 MM HG: ICD-10-PCS | Mod: CPTII,S$GLB,, | Performed by: INTERNAL MEDICINE

## 2023-02-14 PROCEDURE — 80053 COMPREHEN METABOLIC PANEL: CPT | Performed by: INTERNAL MEDICINE

## 2023-02-14 PROCEDURE — 84443 ASSAY THYROID STIM HORMONE: CPT | Performed by: INTERNAL MEDICINE

## 2023-02-14 PROCEDURE — 3078F DIAST BP <80 MM HG: CPT | Mod: CPTII,S$GLB,, | Performed by: INTERNAL MEDICINE

## 2023-02-14 PROCEDURE — 87340 HEPATITIS B SURFACE AG IA: CPT | Performed by: INTERNAL MEDICINE

## 2023-02-14 PROCEDURE — 72202 X-RAY EXAM SI JOINTS 3/> VWS: CPT | Mod: 26,,, | Performed by: RADIOLOGY

## 2023-02-14 PROCEDURE — 86706 HEP B SURFACE ANTIBODY: CPT | Performed by: INTERNAL MEDICINE

## 2023-02-14 PROCEDURE — 86140 C-REACTIVE PROTEIN: CPT | Performed by: INTERNAL MEDICINE

## 2023-02-14 PROCEDURE — 99204 OFFICE O/P NEW MOD 45 MIN: CPT | Mod: S$GLB,,, | Performed by: INTERNAL MEDICINE

## 2023-02-14 PROCEDURE — 84165 PROTEIN E-PHORESIS SERUM: CPT | Performed by: INTERNAL MEDICINE

## 2023-02-14 PROCEDURE — 86038 ANTINUCLEAR ANTIBODIES: CPT | Performed by: INTERNAL MEDICINE

## 2023-02-14 PROCEDURE — 81374 HLA I TYPING 1 ANTIGEN LR: CPT | Mod: PO | Performed by: INTERNAL MEDICINE

## 2023-02-14 PROCEDURE — 85652 RBC SED RATE AUTOMATED: CPT | Performed by: INTERNAL MEDICINE

## 2023-02-14 PROCEDURE — 77077 JOINT SURVEY SINGLE VIEW: CPT | Mod: 26,,, | Performed by: RADIOLOGY

## 2023-02-14 PROCEDURE — 86376 MICROSOMAL ANTIBODY EACH: CPT | Performed by: INTERNAL MEDICINE

## 2023-02-14 PROCEDURE — 84439 ASSAY OF FREE THYROXINE: CPT | Performed by: INTERNAL MEDICINE

## 2023-02-14 PROCEDURE — 99204 PR OFFICE/OUTPT VISIT, NEW, LEVL IV, 45-59 MIN: ICD-10-PCS | Mod: S$GLB,,, | Performed by: INTERNAL MEDICINE

## 2023-02-14 PROCEDURE — 82550 ASSAY OF CK (CPK): CPT | Performed by: INTERNAL MEDICINE

## 2023-02-14 PROCEDURE — 3074F SYST BP LT 130 MM HG: CPT | Mod: CPTII,S$GLB,, | Performed by: INTERNAL MEDICINE

## 2023-02-14 PROCEDURE — 86480 TB TEST CELL IMMUN MEASURE: CPT | Performed by: INTERNAL MEDICINE

## 2023-02-14 PROCEDURE — 99999 PR PBB SHADOW E&M-EST. PATIENT-LVL IV: CPT | Mod: PBBFAC,,, | Performed by: INTERNAL MEDICINE

## 2023-02-14 PROCEDURE — 36415 COLL VENOUS BLD VENIPUNCTURE: CPT | Mod: PO | Performed by: INTERNAL MEDICINE

## 2023-02-14 PROCEDURE — 87389 HIV-1 AG W/HIV-1&-2 AB AG IA: CPT | Performed by: INTERNAL MEDICINE

## 2023-02-14 PROCEDURE — 3008F BODY MASS INDEX DOCD: CPT | Mod: CPTII,S$GLB,, | Performed by: INTERNAL MEDICINE

## 2023-02-14 PROCEDURE — 86431 RHEUMATOID FACTOR QUANT: CPT | Performed by: INTERNAL MEDICINE

## 2023-02-14 PROCEDURE — 86334 PATHOLOGIST INTERPRETATION IFE: ICD-10-PCS | Mod: 26,,, | Performed by: PATHOLOGY

## 2023-02-14 PROCEDURE — 86160 COMPLEMENT ANTIGEN: CPT | Mod: 59 | Performed by: INTERNAL MEDICINE

## 2023-02-14 NOTE — PROGRESS NOTES
RHEUMATOLOGY OUTPATIENT CLINIC NOTE    2/14/2023    Attending Rheumatologist: Fred Perdomo  Primary Care Provider: Jose Carlos Ugalde MD   Physician Requesting Consultation: Aaareferral Self  No address on file  Chief Complaint/Reason For Consultation:  Joint Pain      Subjective:       HPI  Simi Reaves is a 22 y.o. White female with medical history noted below who presents for evaluation of joint pain.     Patient presents for evaluation of joint pain. She notes b/l knee pain and swelling dating back to about 2017. She notes no rhyme or reason due to the pain. She reports her last flare up a couple of days ago, where she has pictures on her phone (redness), she feels warmth. If she does not take anything the pain can last a few hours, if she takes Tylenol/NSAIDs the pain may improve but the redness persists. ?Dislocation of left knee in 2020. She notes an episode of right shoulder pain where it felt as it popped out of socket, pain for a few days but this has resolved. She notes during the summer last year left TMJ pain, managed with muscle relaxers, PDN helped. Notes pain on a daily basis. +Hip popping, hand puffy. +Dry mouth, oral sores.  No rash, hair loss, dry eyes, nasal sores, pleurisy/serositis, Raynaud's, paraesthesias, constipation, diarrhea, fever, fatigue, miscarriage/blood clots, Karen, dactylitis, achilles tendinitis, EYE inflammation, Menstrual irregularities, IBD. Wakes up with lower back, pain has awoken her at night, intermittent in nature, though reports days with prolonged morning stiffness and days with no stiffness, worsened by activity. No trauma.    GMA & Dad with PsO,  in Maternal GGMA Arthritis.       Review of Systems   Constitutional:  Negative for chills, fatigue, fever and unexpected weight change.   HENT:  Positive for mouth sores. Negative for trouble swallowing.    Eyes:  Positive for eye dryness. Negative for redness.   Respiratory:  Negative for cough and  shortness of breath.    Cardiovascular:  Negative for chest pain.   Gastrointestinal:  Negative for abdominal distention, constipation, diarrhea, nausea and vomiting.   Genitourinary:  Negative for dysuria, genital sores and vaginal dryness.   Musculoskeletal:  Positive for arthralgias. Negative for back pain, gait problem, joint swelling, leg pain, myalgias, neck pain, neck stiffness and joint deformity.   Integumentary:  Negative for rash.   Neurological:  Negative for weakness, numbness and headaches.   Hematological:  Negative for adenopathy. Does not bruise/bleed easily.   Psychiatric/Behavioral:  Negative for confusion, decreased concentration and sleep disturbance. The patient is not nervous/anxious.    All other systems reviewed and are negative.     Chronic comorbid conditions affecting medical decision making today:  History reviewed. No pertinent past medical history.  Past Surgical History:   Procedure Laterality Date    LASIK  07/2020    TONSILLECTOMY       Family History   Problem Relation Age of Onset    Colon cancer Neg Hx     Ovarian cancer Neg Hx     Breast cancer Neg Hx      Social History     Substance and Sexual Activity   Alcohol Use Yes    Comment: rarely     Social History     Tobacco Use   Smoking Status Never   Smokeless Tobacco Never     Social History     Substance and Sexual Activity   Drug Use Never       Current Outpatient Medications:     norethindrone-e.estradioL-iron (BLISOVI 24 FE) 1 mg-20 mcg (24)/75 mg (4) per tablet, Take 1 tablet by mouth once daily., Disp: 84 tablet, Rfl: 4    meloxicam (MOBIC) 15 MG tablet, Take 1 tablet (15 mg total) by mouth once daily. (Patient not taking: Reported on 2/14/2023), Disp: 30 tablet, Rfl: 0    predniSONE (DELTASONE) 10 MG tablet, Take 2 tabs daily for 2 days, then 1 tab daily for 3 days. (Patient not taking: Reported on 2/14/2023), Disp: 7 tablet, Rfl: 0     Objective:         Vitals:    02/14/23 1203   BP: 122/72   Pulse: 85      Physical Exam  Can make fist, no synovitis  Wrist, Elbows, Shoulders ok   Hip ROM ok  No knee warmth, effusion or crepitus, ROM ok, Left baker cyst  Negative ankle/MTP  No tender points   NO hypermobility     Reviewed old and all outside pertinent medical records available.    All lab results personally reviewed and interpreted by me.  No results found for: WBC, HGB, HCT, MCV, MCH, MCHC, RDW, PLT, MPV, NEUTROABS, LYMPHOABS, MONOABS, EOSINOABS, BASOSABS, NEUTROPCT, LYMPHOPCT, MONOPCT, EOSINOPCT, BASOPCT, DIFFTYPE, RBCMORPHOLOG, PLTEST    No results found for: NA, K, CL, CO2, GLU, BUN, LABCREA, CALCIUM, PROT, ALBUMIN, BILITOT, AST, ALKPHOS, ALT, GFRAA, GFRNONAA    No results found for: COLORU, APPEARANCEUA, SPECGRAV, PHUR, PHUA, PROTEINUA, GLUCOSEU, KETONESU, BLOODU, LEUKOCYTESUR, NITRITE, UROBILINOGEN    No results found for: CRP    No results found for: SEDRATE, ERYTHROCYTES    No results found for: MORALES, RF, SEDRATE    No components found for: 25OHVITDTOT, 62UEPMHX0, 72LJRHEL6, METHODNOTE    No results found for: URICACID    No components found for: TSPOTTB        Imaging:  All imaging reviewed and independently interpreted by me.         ASSESSMENT / PLAN:     Simi Reaves is a 22 y.o. White female with:      1. Arthralgia, unspecified joint  - patient has had arthralgias which she describes episodes of warmth and tenderness of her knees, she has picture of knee erythema, unclear why she is having these episodes, she started her Birth control around the same time, I suggested she discuss with PCP to see if we can give this a break, the other issue is FHX of PsO, though she does not have any evidence of PsO, can this be a undifferentiated seronegative arthritis  - discussed possible diagnosis and management   - had advised to keep a log for possible triggers  - advised to use Tylenol/NSAIDs PRN  - work up as below  - reassurance   - C-Reactive Protein; Future  - MORALES Screen w/Reflex; Future  -  Rheumatoid Factor; Future  - CBC Auto Differential; Future  - Comprehensive Metabolic Panel; Future  - Cyclic Citrullinated Peptide Antibody, IgG; Future  - Protein/Creatinine Ratio, Urine; Future  - C4 Complement; Future  - C3 Complement; Future  - Anti-Histone Antibody; Future  - Sedimentation rate; Future  - Urinalysis; Future  - Hepatitis B Surface Ab, Qualitative; Future  - Vitamin D; Future  - Uric Acid; Future  - TSH; Future  - Hepatitis C Antibody; Future  - Hepatitis B Surface Antigen; Future  - CK; Future  - XR Arthritis Survey; Future  - Quantiferon Gold TB; Future  - HIV 1/2 Ag/Ab (4th Gen); Future  - HLA B27 Antigen; Future  - IgG 1, 2, 3, and 4; Future  - Immunoglobulins (IgG, IgA, IgM) Quantitative; Future  - Protein Electrophoresis, Serum; Future  - Immunofixation Electrophoresis; Future  - X-Ray Sacroiliac Joints Complete; Future  - RPR; Future  - Interleukin-6; Future  - THYROID PEROXIDASE ANTIBODY; Future  - THYROID STIMULATING IMMUNOGLOBULIN; Future  - Pregnancy, urine rapid; Future    2. Other specified counseling  - over 10 minutes spent regarding below topics:  - Immunization counseling done.  - Weight loss counseling done.  - Nutrition and exercise counseling.  - Limitation of alcohol consumption.  - Regular exercise:  Aerobic and resistance.  - Medication counseling provided.        Follow up in about 3 months (around 5/14/2023).    Method of contact with patient concerns: [facility]    Disclaimer:  This note is prepared using voice recognition software and as such is likely to have errors and has not been proof read. Please contact me for questions.     Time spent: 45 minutes in face to face discussion concerning diagnosis, prognosis, review of lab and test results, benefits of treatment as well as management of disease, counseling of patient and coordination of care between various health care providers.  Greater than half the time spent was used for coordination of care and  counseling of patient.    Fred Perdomo M.D.  Rheumatology Department   [facility]

## 2023-02-15 LAB
ALBUMIN SERPL ELPH-MCNC: 4.35 G/DL (ref 3.35–5.55)
ALPHA1 GLOB SERPL ELPH-MCNC: 0.28 G/DL (ref 0.17–0.41)
ALPHA2 GLOB SERPL ELPH-MCNC: 0.75 G/DL (ref 0.43–0.99)
ANA SER QL IF: NORMAL
B-GLOBULIN SERPL ELPH-MCNC: 0.73 G/DL (ref 0.5–1.1)
GAMMA GLOB SERPL ELPH-MCNC: 0.9 G/DL (ref 0.67–1.58)
INTERPRETATION SERPL IFE-IMP: NORMAL
PROT SERPL-MCNC: 7 G/DL (ref 6–8.4)
RHEUMATOID FACT SERPL-ACNC: <13 IU/ML (ref 0–15)
RPR SER QL: NORMAL
THYROPEROXIDASE IGG SERPL-ACNC: <6 IU/ML

## 2023-02-16 LAB
GAMMA INTERFERON BACKGROUND BLD IA-ACNC: 0 IU/ML
IL6 SERPL-MCNC: <2 PG/ML
M TB IFN-G CD4+ BCKGRND COR BLD-ACNC: 0.01 IU/ML
MITOGEN IGNF BCKGRD COR BLD-ACNC: 10 IU/ML
PATHOLOGIST INTERPRETATION IFE: NORMAL
PATHOLOGIST INTERPRETATION SPE: NORMAL
TB GOLD PLUS: NEGATIVE
TB2 - NIL: 0 IU/ML

## 2023-02-17 LAB
IGG1 SER-MCNC: 464 MG/DL (ref 382–929)
IGG2 SER-MCNC: 369 MG/DL (ref 242–700)
IGG3 SER-MCNC: 62 MG/DL (ref 22–176)
IGG4 SER-MCNC: 13 MG/DL (ref 4–86)
TSI SER-ACNC: <0.1 IU/L

## 2023-02-18 LAB — HISTONE IGG SER IA-ACNC: 0.3 UNITS (ref 0–0.9)

## 2023-02-22 LAB
HLA B27 INTERPRETATION: NORMAL
HLA-B27 RELATED AG QL: NEGATIVE
HLA-B27 RELATED AG QL: NORMAL

## 2023-05-05 RX ORDER — NORETHINDRONE ACETATE AND ETHINYL ESTRADIOL, AND FERROUS FUMARATE 1MG-20(24)
KIT ORAL
Qty: 84 TABLET | Refills: 0 | Status: SHIPPED | OUTPATIENT
Start: 2023-05-05 | End: 2023-05-29 | Stop reason: SDUPTHER

## 2023-05-14 ENCOUNTER — PATIENT MESSAGE (OUTPATIENT)
Dept: RHEUMATOLOGY | Facility: CLINIC | Age: 23
End: 2023-05-14
Payer: COMMERCIAL

## 2023-05-14 ENCOUNTER — PATIENT MESSAGE (OUTPATIENT)
Dept: OBSTETRICS AND GYNECOLOGY | Facility: CLINIC | Age: 23
End: 2023-05-14
Payer: COMMERCIAL

## 2023-05-16 ENCOUNTER — PATIENT MESSAGE (OUTPATIENT)
Dept: RHEUMATOLOGY | Facility: CLINIC | Age: 23
End: 2023-05-16
Payer: COMMERCIAL

## 2023-05-16 RX ORDER — METHYLPREDNISOLONE 4 MG/1
TABLET ORAL
Qty: 1 EACH | Refills: 0 | Status: SHIPPED | OUTPATIENT
Start: 2023-05-16

## 2023-05-29 ENCOUNTER — LAB VISIT (OUTPATIENT)
Dept: LAB | Facility: OTHER | Age: 23
End: 2023-05-29
Attending: STUDENT IN AN ORGANIZED HEALTH CARE EDUCATION/TRAINING PROGRAM
Payer: COMMERCIAL

## 2023-05-29 ENCOUNTER — OFFICE VISIT (OUTPATIENT)
Dept: OBSTETRICS AND GYNECOLOGY | Facility: CLINIC | Age: 23
End: 2023-05-29
Attending: STUDENT IN AN ORGANIZED HEALTH CARE EDUCATION/TRAINING PROGRAM
Payer: COMMERCIAL

## 2023-05-29 VITALS
SYSTOLIC BLOOD PRESSURE: 118 MMHG | WEIGHT: 108.13 LBS | BODY MASS INDEX: 17.45 KG/M2 | DIASTOLIC BLOOD PRESSURE: 62 MMHG

## 2023-05-29 DIAGNOSIS — M25.50 ARTHRALGIA, UNSPECIFIED JOINT: ICD-10-CM

## 2023-05-29 DIAGNOSIS — Z12.4 CERVICAL CANCER SCREENING: ICD-10-CM

## 2023-05-29 DIAGNOSIS — Z01.419 WELL WOMAN EXAM: Primary | ICD-10-CM

## 2023-05-29 LAB
ESTRADIOL SERPL-MCNC: 12 PG/ML
FSH SERPL-ACNC: 0.56 MIU/ML

## 2023-05-29 PROCEDURE — 82670 ASSAY OF TOTAL ESTRADIOL: CPT | Performed by: STUDENT IN AN ORGANIZED HEALTH CARE EDUCATION/TRAINING PROGRAM

## 2023-05-29 PROCEDURE — 87624 HPV HI-RISK TYP POOLED RSLT: CPT | Performed by: STUDENT IN AN ORGANIZED HEALTH CARE EDUCATION/TRAINING PROGRAM

## 2023-05-29 PROCEDURE — 83001 ASSAY OF GONADOTROPIN (FSH): CPT | Performed by: STUDENT IN AN ORGANIZED HEALTH CARE EDUCATION/TRAINING PROGRAM

## 2023-05-29 PROCEDURE — 3074F SYST BP LT 130 MM HG: CPT | Mod: CPTII,S$GLB,, | Performed by: STUDENT IN AN ORGANIZED HEALTH CARE EDUCATION/TRAINING PROGRAM

## 2023-05-29 PROCEDURE — 99999 PR PBB SHADOW E&M-EST. PATIENT-LVL II: CPT | Mod: PBBFAC,,, | Performed by: STUDENT IN AN ORGANIZED HEALTH CARE EDUCATION/TRAINING PROGRAM

## 2023-05-29 PROCEDURE — 3008F PR BODY MASS INDEX (BMI) DOCUMENTED: ICD-10-PCS | Mod: CPTII,S$GLB,, | Performed by: STUDENT IN AN ORGANIZED HEALTH CARE EDUCATION/TRAINING PROGRAM

## 2023-05-29 PROCEDURE — 36415 COLL VENOUS BLD VENIPUNCTURE: CPT | Performed by: STUDENT IN AN ORGANIZED HEALTH CARE EDUCATION/TRAINING PROGRAM

## 2023-05-29 PROCEDURE — 3078F DIAST BP <80 MM HG: CPT | Mod: CPTII,S$GLB,, | Performed by: STUDENT IN AN ORGANIZED HEALTH CARE EDUCATION/TRAINING PROGRAM

## 2023-05-29 PROCEDURE — 88175 CYTOPATH C/V AUTO FLUID REDO: CPT | Performed by: PATHOLOGY

## 2023-05-29 PROCEDURE — 88141 PR  CYTOPATH CERV/VAG INTERPRET: ICD-10-PCS | Mod: ,,, | Performed by: PATHOLOGY

## 2023-05-29 PROCEDURE — 3008F BODY MASS INDEX DOCD: CPT | Mod: CPTII,S$GLB,, | Performed by: STUDENT IN AN ORGANIZED HEALTH CARE EDUCATION/TRAINING PROGRAM

## 2023-05-29 PROCEDURE — 99999 PR PBB SHADOW E&M-EST. PATIENT-LVL II: ICD-10-PCS | Mod: PBBFAC,,, | Performed by: STUDENT IN AN ORGANIZED HEALTH CARE EDUCATION/TRAINING PROGRAM

## 2023-05-29 PROCEDURE — 99395 PREV VISIT EST AGE 18-39: CPT | Mod: S$GLB,,, | Performed by: STUDENT IN AN ORGANIZED HEALTH CARE EDUCATION/TRAINING PROGRAM

## 2023-05-29 PROCEDURE — 3074F PR MOST RECENT SYSTOLIC BLOOD PRESSURE < 130 MM HG: ICD-10-PCS | Mod: CPTII,S$GLB,, | Performed by: STUDENT IN AN ORGANIZED HEALTH CARE EDUCATION/TRAINING PROGRAM

## 2023-05-29 PROCEDURE — 1159F PR MEDICATION LIST DOCUMENTED IN MEDICAL RECORD: ICD-10-PCS | Mod: CPTII,S$GLB,, | Performed by: STUDENT IN AN ORGANIZED HEALTH CARE EDUCATION/TRAINING PROGRAM

## 2023-05-29 PROCEDURE — 99395 PR PREVENTIVE VISIT,EST,18-39: ICD-10-PCS | Mod: S$GLB,,, | Performed by: STUDENT IN AN ORGANIZED HEALTH CARE EDUCATION/TRAINING PROGRAM

## 2023-05-29 PROCEDURE — 88141 CYTOPATH C/V INTERPRET: CPT | Mod: ,,, | Performed by: PATHOLOGY

## 2023-05-29 PROCEDURE — 1159F MED LIST DOCD IN RCRD: CPT | Mod: CPTII,S$GLB,, | Performed by: STUDENT IN AN ORGANIZED HEALTH CARE EDUCATION/TRAINING PROGRAM

## 2023-05-29 PROCEDURE — 3078F PR MOST RECENT DIASTOLIC BLOOD PRESSURE < 80 MM HG: ICD-10-PCS | Mod: CPTII,S$GLB,, | Performed by: STUDENT IN AN ORGANIZED HEALTH CARE EDUCATION/TRAINING PROGRAM

## 2023-05-29 RX ORDER — NORETHINDRONE ACETATE AND ETHINYL ESTRADIOL, AND FERROUS FUMARATE 1MG-20(24)
1 KIT ORAL DAILY
Qty: 84 TABLET | Refills: 3 | Status: SHIPPED | OUTPATIENT
Start: 2023-05-29

## 2023-05-29 NOTE — PROGRESS NOTES
Chief Complaint: Well Woman Exam     HPI:      Simi Reaves is a 22 y.o.  who presents today for well woman exam.  LMP: Patient's last menstrual period was 2023.  Reports joint pain and heat.  Also has had several issues with joints over the past few years.  Is seeing rheumatologist.  Also has seen PCP.  They had questions regarding hormonal component.  No other issues, problems, or complaints. Doing well on OCP and cycles regular.  Symptoms not affected by cycle.  Specifically, patient denies abnormal vaginal bleeding, discharge, pelvic pain, urinary problems, or changes in appetite. Ms. Reaves is currently sexually active with a single male partner. She is currently using oral contraceptives (estrogen/progesterone) for contraception. She declines STD screening today.    Previous Pap: 2023 NEM    OB History          0    Para   0    Term   0       0    AB   0    Living   0         SAB   0    IAB   0    Ectopic   0    Multiple   0    Live Births   0           Obstetric Comments   Menarche age 10             History reviewed. No pertinent past medical history.  Past Surgical History:   Procedure Laterality Date    LASIK  2020    TONSILLECTOMY       Social History     Socioeconomic History    Marital status: Single   Tobacco Use    Smoking status: Never    Smokeless tobacco: Never   Substance and Sexual Activity    Alcohol use: Yes     Comment: rarely    Drug use: Never    Sexual activity: Not Currently     Birth control/protection: OCP     Family History   Problem Relation Age of Onset    Colon cancer Neg Hx     Ovarian cancer Neg Hx     Breast cancer Neg Hx        Current Outpatient Medications:     MARY 24 FE 1 mg-20 mcg (24)/75 mg (4) per tablet, TAKE 1 TABLET BY MOUTH EVERY DAY, Disp: 84 tablet, Rfl: 0    meloxicam (MOBIC) 15 MG tablet, Take 1 tablet (15 mg total) by mouth once daily. (Patient not taking: Reported on 2023), Disp: 30 tablet, Rfl: 0     methylPREDNISolone (MEDROL DOSEPACK) 4 mg tablet, use as directed, Disp: 1 each, Rfl: 0    predniSONE (DELTASONE) 10 MG tablet, Take 2 tabs daily for 2 days, then 1 tab daily for 3 days. (Patient not taking: Reported on 2/14/2023), Disp: 7 tablet, Rfl: 0    ROS:     GENERAL: Denies unintentional weight gain or weight loss. Feeling well overall.   SKIN: Denies rash or lesions.   HEENT: Denies headaches, or vision changes.   CARDIOVASCULAR: Denies palpitations or chest pain.   RESPIRATORY: Denies shortness of breath or dyspnea on exertion.  BREASTS: Denies pain, lumps, or nipple discharge.   ABDOMEN: Denies abdominal pain, constipation, diarrhea, nausea, vomiting, change in appetite.  URINARY: Denies frequency, dysuria, hematuria.  NEUROLOGIC: Denies syncope or weakness.   PSYCHIATRIC: Denies depression, anxiety or mood swings.    Physical Exam:      PHYSICAL EXAM:  /62 (BP Location: Left arm, Patient Position: Sitting, BP Method: Medium (Manual))   Wt 49 kg (108 lb 2.2 oz)   LMP 05/05/2023   BMI 17.45 kg/m²   Body mass index is 17.45 kg/m².     APPEARANCE: Well nourished, well developed, in no acute distress.  PSYCH: Appropriate mood and affect.  SKIN: No acne or hirsutism.  NECK: Neck symmetric without masses or thyromegaly.  NODES: No inguinal, axillary, or supraclavicular lymph node enlargement.  CHEST: Normal respiratory effort.    CARDIOVASCULAR:  Regular rate and rhythm.  BREASTS: Symmetrical, no skin changes or visible lesions.  No palpable masses or nipple discharge bilaterally.  ABDOMEN: Soft.  No tenderness or masses.    PELVIC: Normal external genitalia without lesions.  Normal hair distribution.  Adequate perineal body, normal urethral meatus.  Vagina moist and well rugated without lesions or discharge.  Cervix pink, without lesions, discharge or tenderness.  No significant cystocele or rectocele.  Bimanual exam shows uterus to be normal size, regular, mobile and nontender.  Adnexa without masses  or tenderness.    EXTREMITIES: No edema.  No tenderness to palpation.      Assessment/Plan:     22 y.o.     Well woman exam    Arthralgia, unspecified joint  -     ESTRADIOL; Future; Expected date: 2023  -     FOLLICLE STIMULATING HORMONE; Future; Expected date: 2023    Cervical cancer screening  -     Liquid-Based Pap Smear, Screening            Counselin.  Annual exam performed today without difficulty.  Patient was counseled today on current ASCCP pap guidelines, the recommendation for yearly pelvic exams, healthy diet and exercise routines, breast self awareness.  Pap smear collected.  All questions answered.    2.  Contraception:  Desires to continue OCPs.  R/B/A reviewed including but not limited to risk of cramping, irregular bleeding, nausea, bloating, breast tenderness, headache, stroke, blood clot, heart attack.  Patient voiced understanding and desires to proceed with treatment.  Labs today.  Symptoms not typical with OCP.  Will check FSH/estradiol.  Will follow up results.  We discussed giving OCP a break to see if this helps symptoms or changing up OCP to see if this makes a difference.  She would prefer no to come off as PMS symptoms, dysmenorrhea, AUB were significant prior to starting OCP.  All questions answered.  Will follow up results.  Continue current pill for now.    3.  Follow up with PCP for other health maintenance.  4.  No follow-ups on file.      Use of the [facility] Patient Portal discussed and encouraged during today's visit.

## 2023-06-06 LAB
FINAL PATHOLOGIC DIAGNOSIS: ABNORMAL
Lab: ABNORMAL

## 2023-06-09 LAB
HPV HR 12 DNA SPEC QL NAA+PROBE: POSITIVE
HPV16 AG SPEC QL: NEGATIVE
HPV18 DNA SPEC QL NAA+PROBE: NEGATIVE

## 2023-06-12 ENCOUNTER — TELEPHONE (OUTPATIENT)
Dept: OBSTETRICS AND GYNECOLOGY | Facility: CLINIC | Age: 23
End: 2023-06-12
Payer: COMMERCIAL

## 2023-06-14 ENCOUNTER — PATIENT MESSAGE (OUTPATIENT)
Dept: OBSTETRICS AND GYNECOLOGY | Facility: CLINIC | Age: 23
End: 2023-06-14
Payer: COMMERCIAL

## 2023-06-14 ENCOUNTER — TELEPHONE (OUTPATIENT)
Dept: OBSTETRICS AND GYNECOLOGY | Facility: CLINIC | Age: 23
End: 2023-06-14
Payer: COMMERCIAL

## 2023-06-14 DIAGNOSIS — M25.50 ARTHRALGIA, UNSPECIFIED JOINT: Primary | ICD-10-CM

## 2023-06-15 RX ORDER — TRANEXAMIC ACID 650 MG/1
1300 TABLET ORAL 3 TIMES DAILY
Qty: 30 TABLET | Refills: 0 | Status: SHIPPED | OUTPATIENT
Start: 2023-06-15 | End: 2023-06-20

## 2023-06-15 NOTE — TELEPHONE ENCOUNTER
Spoke with patient.  Reviewed ASCCP guidelines and all questions answered.  ASCUS, HPV non 16/18 pos.  Repeat pap in 1 year.  Patient in agreement.  All questions answered.      Will also give 2 month break for OCPs to see if symptoms resolve and levels normalize.  R/B/A reviewed and all questions answered.  Lysteda to pharmacy to help with cycle.s  understands is not contraception.  Upt prior to meds.  She is in agreement with plan.      Please set patient up for Gardasil vaccine at end of August with repeat labs.

## 2023-09-18 ENCOUNTER — PATIENT MESSAGE (OUTPATIENT)
Dept: ADMINISTRATIVE | Facility: OTHER | Age: 23
End: 2023-09-18
Payer: COMMERCIAL

## 2023-09-19 ENCOUNTER — PATIENT MESSAGE (OUTPATIENT)
Dept: ADMINISTRATIVE | Facility: OTHER | Age: 23
End: 2023-09-19
Payer: COMMERCIAL

## 2023-09-20 ENCOUNTER — LAB VISIT (OUTPATIENT)
Dept: LAB | Facility: HOSPITAL | Age: 23
End: 2023-09-20
Attending: OBSTETRICS & GYNECOLOGY
Payer: COMMERCIAL

## 2023-09-20 ENCOUNTER — ANESTHESIA EVENT (OUTPATIENT)
Dept: SURGERY | Facility: HOSPITAL | Age: 23
End: 2023-09-20
Payer: COMMERCIAL

## 2023-09-20 ENCOUNTER — PATIENT MESSAGE (OUTPATIENT)
Dept: ADMINISTRATIVE | Facility: OTHER | Age: 23
End: 2023-09-20
Payer: COMMERCIAL

## 2023-09-20 DIAGNOSIS — N94.6 DYSMENORRHEA: Primary | ICD-10-CM

## 2023-09-20 DIAGNOSIS — R10.2 PERINEAL NEURALGIA, UNSPECIFIED LATERALITY: ICD-10-CM

## 2023-09-20 DIAGNOSIS — N80.9 ENDOMETRIOTIC CYST: ICD-10-CM

## 2023-09-20 LAB
ABORH RETYPE: NORMAL
ANION GAP SERPL CALC-SCNC: 9 MEQ/L
B-HCG FREE SERPL-ACNC: <2.42 MIU/ML
BUN SERPL-MCNC: 11.6 MG/DL (ref 7–18.7)
CALCIUM SERPL-MCNC: 9.6 MG/DL (ref 8.4–10.2)
CHLORIDE SERPL-SCNC: 108 MMOL/L (ref 98–107)
CO2 SERPL-SCNC: 21 MMOL/L (ref 22–29)
CREAT SERPL-MCNC: 0.79 MG/DL (ref 0.55–1.02)
CREAT/UREA NIT SERPL: 15
ERYTHROCYTE [DISTWIDTH] IN BLOOD BY AUTOMATED COUNT: 16.5 % (ref 11.5–17)
GFR SERPLBLD CREATININE-BSD FMLA CKD-EPI: >60 MLS/MIN/1.73/M2
GLUCOSE SERPL-MCNC: 107 MG/DL (ref 74–100)
GROUP & RH: NORMAL
HCT VFR BLD AUTO: 31.5 % (ref 37–47)
HGB BLD-MCNC: 9.4 G/DL (ref 12–16)
INDIRECT COOMBS GEL: NORMAL
MCH RBC QN AUTO: 23 PG (ref 27–31)
MCHC RBC AUTO-ENTMCNC: 29.8 G/DL (ref 33–36)
MCV RBC AUTO: 77 FL (ref 80–94)
NRBC BLD AUTO-RTO: 0 %
PLATELET # BLD AUTO: 341 X10(3)/MCL (ref 130–400)
PMV BLD AUTO: 10.8 FL (ref 7.4–10.4)
POTASSIUM SERPL-SCNC: 4 MMOL/L (ref 3.5–5.1)
RBC # BLD AUTO: 4.09 X10(6)/MCL (ref 4.2–5.4)
SODIUM SERPL-SCNC: 138 MMOL/L (ref 136–145)
SPECIMEN OUTDATE: NORMAL
WBC # SPEC AUTO: 6.61 X10(3)/MCL (ref 4.5–11.5)

## 2023-09-20 PROCEDURE — 86850 RBC ANTIBODY SCREEN: CPT | Performed by: OBSTETRICS & GYNECOLOGY

## 2023-09-20 PROCEDURE — 36415 COLL VENOUS BLD VENIPUNCTURE: CPT

## 2023-09-20 PROCEDURE — 80048 BASIC METABOLIC PNL TOTAL CA: CPT

## 2023-09-20 PROCEDURE — 84702 CHORIONIC GONADOTROPIN TEST: CPT

## 2023-09-20 PROCEDURE — 85027 COMPLETE CBC AUTOMATED: CPT

## 2023-09-20 NOTE — H&P (VIEW-ONLY)
Chief Complaint:  pelvic pain suspected endometriosis, surgical counseling    History of Present Illness    Simi Reaves is a 23 y.o.       reports menarchy at age 10 with severely painful menses soon after that caused her to frequently miss school. She reports having debilitating pain to the point of being unable to stand.  About 7 years ago she was started on BCPs which she continued until 3 weeks ago when at the advice of her rheumatologist she stopped it.  Her local gyn agreed to check E2 and FSH levels which were both somewhat suppressed--likely due to the long-term use of BCPs.     She did have a painful menstrual cycle on 2023 soon after stopping the BCPs and she is concerned about a return of increasingly painful cycles going forward.      Past Medical History:   Diagnosis Date    Dysmenorrhea     Endometriotic cyst     Joint pain     Vaginal pain        Past Surgical History:   Procedure Laterality Date    LASIK  2020    TONSILLECTOMY         Family History   Problem Relation Age of Onset    Colon cancer Neg Hx     Ovarian cancer Neg Hx     Breast cancer Neg Hx        Social History     Tobacco Use   Smoking Status Never   Smokeless Tobacco Never       Social History     Substance and Sexual Activity   Sexual Activity Not Currently    Birth control/protection: OCP        OB History    Para Term  AB Living   0 0 0 0 0 0   SAB IAB Ectopic Multiple Live Births   0 0 0 0 0   Obstetric Comments   Menarche age 10       Review of Systems  GENERAL: Denies unintentional weight gain or weight loss. Feeling well overall.   SKIN: Denies rash or lesions.   HEENT: Denies headaches, or vision changes.   CARDIOVASCULAR: Denies palpitations or chest pain.   RESPIRATORY: Denies shortness of breath or dyspnea on exertion.  BREASTS: Denies pain, lumps, or nipple discharge.   ABDOMEN: Denies abdominal pain, constipation, diarrhea, nausea, vomiting, change in  "appetite.  URINARY: Denies frequency, dysuria, hematuria.  NEUROLOGIC: Denies syncope or weakness.   PSYCHIATRIC: Denies depression, anxiety or mood swings.     Objective:     /77   Pulse 66   Ht 5' 5.98" (1.676 m)   Wt 47.3 kg (104 lb 4.4 oz)   LMP 06/28/2023   BMI 16.84 kg/m²     Body mass index is 16.84 kg/m².    APPEARANCE: Well nourished, well developed, in no acute distress.  PSYCH: Appropriate mood and affect.  SKIN: No acne or hirsutism    NODES: No inguinal, axillary, or supraclavicular lymph node enlargement  CHEST: Normal respiratory effort  CV: Normal cap refill.    ABDOMEN: Soft.  No tenderness or masses.    EXTREMITIES: No edema           Assessment/ Plan:     1. Endometriosis        2. Dysmenorrhea        3. Female pelvic pain        4. Underweight            1. To OR for Robot-assisted laparoxopy and anticipated resection of endometriosis this week.    2. After detailed review of surgery and potential risks, consent signed.    3. Discussed likely Calorie deficiency and low BMI and likely cause of recent amennorhea      Counseling     With the patient I had a full discussion of the risks, benefits, and alternatives of all procedures to be performed, including but not limited to injury to other organs, failure to resolve problem, recurrence of disease state, and infection. We discussed the risks, benefits, and alternatives as well.  She agrees with the plan of care; therefore, we will proceed with the surgery as scheduled.     "

## 2023-09-20 NOTE — PRE-PROCEDURE INSTRUCTIONS
[facility]: Outpatient Surgery  Preprocedure Instructions     Your arrival time for your surgery or procedure is 5:00AM.  We ask patients to arrive about 2 hours before surgery to allow for enough time to review your health history & medications, start your IV, complete any outstanding labwork or tests, and meet your Anesthesiologist.  You will arrive at [facility], [address] [place] [address].  Enter through the [place] entrance next to the Emergency Room, and come to the 6th floor to the Outpatient Surgery Department.     Visitory Policy:  You are allowed 2 adult visitors to be with you in the hospital. Please, no switching visitors in pre-op area. All hospital visitors should be in good current health.  No small children.     What to Bring:  Please have your ID, insurance cards, and all home medication bottles with you at check in.  Bring your CPAP machine if one is used at home.     Fasting:  Nothing to eat or drink after midnight the night before your procedure. This includes no ice, gum, hard candies, and/or tobacco products.    Medications:  Follow your doctor's instructions for taking any medications on the morning of your procedure.  If no instructions for taking medications were given, do not take any medications but bring your medications in their bottles to your procedure check in.     Follow your doctor's preoperative instructions regarding skin prep, bowel prep, bathing, or showering prior to your procedure.  If any special soaps were provided to you, please use according to your doctor's instructions. If no instructions were given from your doctor, take a good bath or shower with antibacterial soap the night before and the morning of your procedure.  On the morning of procedure, wear loose, comfortable clothing.  No lotions, makeup, perfumes, colognes, deodorant, or jewelry to your procedure.  Removable items (glasses, contact lenses, dentures, retainers,  hearing aids) need to be removed for your procedure.  Bring your storage containers for these items if you wear them.     Artificial nails, body jewelry, eyelash extensions, and/or hair extensions with metal clips are not allowed during your surgery.  If you currently wear any of these items, please arrange for them to be removed prior to your arrival to the hospital.     Outpatient or Same Day Surgeries:  Any patients receiving sedation/anesthesia are advised not to drive for 24 hours after their procedure.  We do not allow patients to drive themselves home after discharge.  If you are going home after your procedure, please have someone available to drive you home from the hospital.        You may call the Outpatient Surgery Department at [phone] with any questions or concerns.  We are looking forward to meeting you and taking great care of you for your procedure.  Thank you for choosing [facility] for your surgical needs.

## 2023-09-21 ENCOUNTER — ANESTHESIA (OUTPATIENT)
Dept: SURGERY | Facility: HOSPITAL | Age: 23
End: 2023-09-21
Payer: COMMERCIAL

## 2023-09-21 ENCOUNTER — HOSPITAL ENCOUNTER (OUTPATIENT)
Facility: HOSPITAL | Age: 23
Discharge: HOME | End: 2023-09-22
Attending: OBSTETRICS & GYNECOLOGY | Admitting: OBSTETRICS & GYNECOLOGY
Payer: COMMERCIAL

## 2023-09-21 DIAGNOSIS — R10.2 VAGINAL PAIN: ICD-10-CM

## 2023-09-21 DIAGNOSIS — R10.2 FEMALE PELVIC PAIN: ICD-10-CM

## 2023-09-21 DIAGNOSIS — N94.6 DYSMENORRHEA: Primary | ICD-10-CM

## 2023-09-21 DIAGNOSIS — N80.9 ENDOMETRIOTIC CYST: ICD-10-CM

## 2023-09-21 LAB — B-HCG FREE SERPL-ACNC: <2.42 MIU/ML

## 2023-09-21 PROCEDURE — 84702 CHORIONIC GONADOTROPIN TEST: CPT | Performed by: OBSTETRICS & GYNECOLOGY

## 2023-09-21 PROCEDURE — 43659 UNLISTED LAPS PX STOMACH: CPT | Mod: ,,, | Performed by: SURGERY

## 2023-09-21 PROCEDURE — 63600175 PHARM REV CODE 636 W HCPCS: Performed by: ANESTHESIOLOGY

## 2023-09-21 PROCEDURE — 43659 PR LAPROSCOPIC GASTRORRHAPY: ICD-10-PCS | Mod: ,,, | Performed by: SURGERY

## 2023-09-21 PROCEDURE — 27201423 OPTIME MED/SURG SUP & DEVICES STERILE SUPPLY: Performed by: OBSTETRICS & GYNECOLOGY

## 2023-09-21 PROCEDURE — 25000003 PHARM REV CODE 250: Performed by: ANESTHESIOLOGY

## 2023-09-21 PROCEDURE — 25000003 PHARM REV CODE 250: Performed by: OBSTETRICS & GYNECOLOGY

## 2023-09-21 PROCEDURE — 37000009 HC ANESTHESIA EA ADD 15 MINS: Performed by: OBSTETRICS & GYNECOLOGY

## 2023-09-21 PROCEDURE — 36000710: Performed by: OBSTETRICS & GYNECOLOGY

## 2023-09-21 PROCEDURE — 25000003 PHARM REV CODE 250

## 2023-09-21 PROCEDURE — 25000003 PHARM REV CODE 250: Performed by: NURSE ANESTHETIST, CERTIFIED REGISTERED

## 2023-09-21 PROCEDURE — 63600175 PHARM REV CODE 636 W HCPCS

## 2023-09-21 PROCEDURE — 71000033 HC RECOVERY, INTIAL HOUR: Performed by: OBSTETRICS & GYNECOLOGY

## 2023-09-21 PROCEDURE — 37000008 HC ANESTHESIA 1ST 15 MINUTES: Performed by: OBSTETRICS & GYNECOLOGY

## 2023-09-21 PROCEDURE — D9220A PRA ANESTHESIA: ICD-10-PCS | Mod: ,,, | Performed by: NURSE ANESTHETIST, CERTIFIED REGISTERED

## 2023-09-21 PROCEDURE — 71000015 HC POSTOP RECOV 1ST HR: Performed by: OBSTETRICS & GYNECOLOGY

## 2023-09-21 PROCEDURE — 71000039 HC RECOVERY, EACH ADD'L HOUR: Performed by: OBSTETRICS & GYNECOLOGY

## 2023-09-21 PROCEDURE — D9220A PRA ANESTHESIA: Mod: ,,, | Performed by: NURSE ANESTHETIST, CERTIFIED REGISTERED

## 2023-09-21 PROCEDURE — 36000711: Performed by: OBSTETRICS & GYNECOLOGY

## 2023-09-21 PROCEDURE — 63600175 PHARM REV CODE 636 W HCPCS: Performed by: NURSE ANESTHETIST, CERTIFIED REGISTERED

## 2023-09-21 PROCEDURE — 94799 UNLISTED PULMONARY SVC/PX: CPT

## 2023-09-21 PROCEDURE — G0378 HOSPITAL OBSERVATION PER HR: HCPCS

## 2023-09-21 RX ORDER — CEFAZOLIN SODIUM 2 G/50ML
2 SOLUTION INTRAVENOUS
Status: DISCONTINUED | OUTPATIENT
Start: 2023-09-21 | End: 2023-09-21

## 2023-09-21 RX ORDER — ONDANSETRON 2 MG/ML
4 INJECTION INTRAMUSCULAR; INTRAVENOUS DAILY PRN
Status: DISCONTINUED | OUTPATIENT
Start: 2023-09-21 | End: 2023-09-21 | Stop reason: HOSPADM

## 2023-09-21 RX ORDER — GABAPENTIN 300 MG/1
CAPSULE ORAL
Status: COMPLETED
Start: 2023-09-21 | End: 2023-09-21

## 2023-09-21 RX ORDER — BUPIVACAINE HYDROCHLORIDE AND EPINEPHRINE 5; 5 MG/ML; UG/ML
INJECTION, SOLUTION EPIDURAL; INTRACAUDAL; PERINEURAL
Status: DISCONTINUED | OUTPATIENT
Start: 2023-09-21 | End: 2023-09-21 | Stop reason: HOSPADM

## 2023-09-21 RX ORDER — MIDAZOLAM HYDROCHLORIDE 1 MG/ML
2 INJECTION INTRAMUSCULAR; INTRAVENOUS ONCE AS NEEDED
Status: DISCONTINUED | OUTPATIENT
Start: 2023-09-21 | End: 2023-09-21 | Stop reason: HOSPADM

## 2023-09-21 RX ORDER — ONDANSETRON 4 MG/1
8 TABLET, ORALLY DISINTEGRATING ORAL EVERY 8 HOURS PRN
Status: DISCONTINUED | OUTPATIENT
Start: 2023-09-21 | End: 2023-09-22 | Stop reason: HOSPADM

## 2023-09-21 RX ORDER — ONDANSETRON 4 MG/1
4 TABLET, ORALLY DISINTEGRATING ORAL ONCE
Status: COMPLETED | OUTPATIENT
Start: 2023-09-21 | End: 2023-09-21

## 2023-09-21 RX ORDER — ROCURONIUM BROMIDE 10 MG/ML
INJECTION, SOLUTION INTRAVENOUS
Status: DISCONTINUED | OUTPATIENT
Start: 2023-09-21 | End: 2023-09-21

## 2023-09-21 RX ORDER — FENTANYL CITRATE 50 UG/ML
INJECTION, SOLUTION INTRAMUSCULAR; INTRAVENOUS
Status: DISCONTINUED | OUTPATIENT
Start: 2023-09-21 | End: 2023-09-21

## 2023-09-21 RX ORDER — MIDAZOLAM HYDROCHLORIDE 1 MG/ML
INJECTION INTRAMUSCULAR; INTRAVENOUS
Status: DISCONTINUED | OUTPATIENT
Start: 2023-09-21 | End: 2023-09-21

## 2023-09-21 RX ORDER — DEXAMETHASONE SODIUM PHOSPHATE 4 MG/ML
INJECTION, SOLUTION INTRA-ARTICULAR; INTRALESIONAL; INTRAMUSCULAR; INTRAVENOUS; SOFT TISSUE
Status: DISCONTINUED | OUTPATIENT
Start: 2023-09-21 | End: 2023-09-21

## 2023-09-21 RX ORDER — HYDROMORPHONE HYDROCHLORIDE 2 MG/ML
0.2 INJECTION, SOLUTION INTRAMUSCULAR; INTRAVENOUS; SUBCUTANEOUS EVERY 5 MIN PRN
Status: DISCONTINUED | OUTPATIENT
Start: 2023-09-21 | End: 2023-09-21

## 2023-09-21 RX ORDER — PROCHLORPERAZINE EDISYLATE 5 MG/ML
5 INJECTION INTRAMUSCULAR; INTRAVENOUS EVERY 6 HOURS PRN
Status: DISCONTINUED | OUTPATIENT
Start: 2023-09-21 | End: 2023-09-22 | Stop reason: HOSPADM

## 2023-09-21 RX ORDER — SODIUM CHLORIDE 9 MG/ML
INJECTION, SOLUTION INTRAVENOUS CONTINUOUS
Status: DISCONTINUED | OUTPATIENT
Start: 2023-09-21 | End: 2023-09-21

## 2023-09-21 RX ORDER — OXYCODONE AND ACETAMINOPHEN 5; 325 MG/1; MG/1
1 TABLET ORAL EVERY 4 HOURS PRN
Qty: 28 TABLET | Refills: 0 | Status: SHIPPED | OUTPATIENT
Start: 2023-09-21

## 2023-09-21 RX ORDER — GABAPENTIN 300 MG/1
300 CAPSULE ORAL 3 TIMES DAILY
Status: DISCONTINUED | OUTPATIENT
Start: 2023-09-21 | End: 2023-09-21

## 2023-09-21 RX ORDER — LORAZEPAM 2 MG/ML
0.25 INJECTION INTRAMUSCULAR ONCE AS NEEDED
Status: DISCONTINUED | OUTPATIENT
Start: 2023-09-21 | End: 2023-09-21 | Stop reason: RX

## 2023-09-21 RX ORDER — MEPERIDINE HYDROCHLORIDE 50 MG/ML
12.5 INJECTION INTRAMUSCULAR; INTRAVENOUS; SUBCUTANEOUS
Status: DISCONTINUED | OUTPATIENT
Start: 2023-09-21 | End: 2023-09-21 | Stop reason: HOSPADM

## 2023-09-21 RX ORDER — LIDOCAINE HYDROCHLORIDE 10 MG/ML
1 INJECTION, SOLUTION EPIDURAL; INFILTRATION; INTRACAUDAL; PERINEURAL ONCE
Status: DISCONTINUED | OUTPATIENT
Start: 2023-09-21 | End: 2023-09-21

## 2023-09-21 RX ORDER — MIDAZOLAM HYDROCHLORIDE 1 MG/ML
2 INJECTION INTRAMUSCULAR; INTRAVENOUS ONCE
Status: DISCONTINUED | OUTPATIENT
Start: 2023-09-21 | End: 2023-09-21

## 2023-09-21 RX ORDER — DIPHENHYDRAMINE HYDROCHLORIDE 50 MG/ML
25 INJECTION INTRAMUSCULAR; INTRAVENOUS EVERY 6 HOURS PRN
Status: DISCONTINUED | OUTPATIENT
Start: 2023-09-21 | End: 2023-09-21 | Stop reason: HOSPADM

## 2023-09-21 RX ORDER — SODIUM CHLORIDE, SODIUM GLUCONATE, SODIUM ACETATE, POTASSIUM CHLORIDE AND MAGNESIUM CHLORIDE 30; 37; 368; 526; 502 MG/100ML; MG/100ML; MG/100ML; MG/100ML; MG/100ML
INJECTION, SOLUTION INTRAVENOUS CONTINUOUS
Status: DISCONTINUED | OUTPATIENT
Start: 2023-09-21 | End: 2023-09-21

## 2023-09-21 RX ORDER — PROPOFOL 10 MG/ML
VIAL (ML) INTRAVENOUS
Status: DISCONTINUED | OUTPATIENT
Start: 2023-09-21 | End: 2023-09-21

## 2023-09-21 RX ORDER — ACETAMINOPHEN 500 MG
1000 TABLET ORAL EVERY 6 HOURS PRN
Status: DISCONTINUED | OUTPATIENT
Start: 2023-09-21 | End: 2023-09-22 | Stop reason: HOSPADM

## 2023-09-21 RX ORDER — FAMOTIDINE 20 MG/1
20 TABLET, FILM COATED ORAL
Status: COMPLETED | OUTPATIENT
Start: 2023-09-21 | End: 2023-09-21

## 2023-09-21 RX ORDER — CEFAZOLIN SODIUM 1 G/3ML
INJECTION, POWDER, FOR SOLUTION INTRAMUSCULAR; INTRAVENOUS
Status: DISCONTINUED | OUTPATIENT
Start: 2023-09-21 | End: 2023-09-21

## 2023-09-21 RX ORDER — METOCLOPRAMIDE HYDROCHLORIDE 5 MG/ML
10 INJECTION INTRAMUSCULAR; INTRAVENOUS EVERY 10 MIN PRN
Status: DISCONTINUED | OUTPATIENT
Start: 2023-09-21 | End: 2023-09-21 | Stop reason: HOSPADM

## 2023-09-21 RX ORDER — HYDROMORPHONE HYDROCHLORIDE 2 MG/ML
0.5 INJECTION, SOLUTION INTRAMUSCULAR; INTRAVENOUS; SUBCUTANEOUS EVERY 5 MIN PRN
Status: DISCONTINUED | OUTPATIENT
Start: 2023-09-21 | End: 2023-09-21 | Stop reason: HOSPADM

## 2023-09-21 RX ORDER — ONDANSETRON 2 MG/ML
INJECTION INTRAMUSCULAR; INTRAVENOUS
Status: DISCONTINUED | OUTPATIENT
Start: 2023-09-21 | End: 2023-09-21

## 2023-09-21 RX ORDER — HYDROCODONE BITARTRATE AND ACETAMINOPHEN 5; 325 MG/1; MG/1
1 TABLET ORAL EVERY 4 HOURS PRN
Status: DISCONTINUED | OUTPATIENT
Start: 2023-09-21 | End: 2023-09-22 | Stop reason: HOSPADM

## 2023-09-21 RX ORDER — HYDROMORPHONE HYDROCHLORIDE 2 MG/ML
1 INJECTION, SOLUTION INTRAMUSCULAR; INTRAVENOUS; SUBCUTANEOUS EVERY 4 HOURS PRN
Status: DISCONTINUED | OUTPATIENT
Start: 2023-09-21 | End: 2023-09-22 | Stop reason: HOSPADM

## 2023-09-21 RX ORDER — IBUPROFEN 600 MG/1
600 TABLET ORAL EVERY 6 HOURS PRN
Status: DISCONTINUED | OUTPATIENT
Start: 2023-09-21 | End: 2023-09-22 | Stop reason: HOSPADM

## 2023-09-21 RX ORDER — MEPERIDINE HYDROCHLORIDE 25 MG/ML
INJECTION INTRAMUSCULAR; INTRAVENOUS; SUBCUTANEOUS
Status: COMPLETED
Start: 2023-09-21 | End: 2023-09-21

## 2023-09-21 RX ADMIN — PROPOFOL 150 MG: 10 INJECTION, EMULSION INTRAVENOUS at 07:09

## 2023-09-21 RX ADMIN — FAMOTIDINE 20 MG: 20 TABLET, FILM COATED ORAL at 06:09

## 2023-09-21 RX ADMIN — GABAPENTIN 300 MG: 300 CAPSULE ORAL at 06:09

## 2023-09-21 RX ADMIN — HYDROCODONE BITARTRATE AND ACETAMINOPHEN 1 TABLET: 5; 325 TABLET ORAL at 05:09

## 2023-09-21 RX ADMIN — MEPERIDINE HYDROCHLORIDE 25 MG: 25 INJECTION INTRAMUSCULAR; INTRAVENOUS; SUBCUTANEOUS at 10:09

## 2023-09-21 RX ADMIN — ROCURONIUM BROMIDE 40 MG: 10 SOLUTION INTRAVENOUS at 07:09

## 2023-09-21 RX ADMIN — FENTANYL CITRATE 100 MCG: 50 INJECTION, SOLUTION INTRAMUSCULAR; INTRAVENOUS at 07:09

## 2023-09-21 RX ADMIN — HYDROCODONE BITARTRATE AND ACETAMINOPHEN 1 TABLET: 5; 325 TABLET ORAL at 09:09

## 2023-09-21 RX ADMIN — DEXAMETHASONE SODIUM PHOSPHATE 4 MG: 4 INJECTION, SOLUTION INTRA-ARTICULAR; INTRALESIONAL; INTRAMUSCULAR; INTRAVENOUS; SOFT TISSUE at 07:09

## 2023-09-21 RX ADMIN — HYDROMORPHONE HYDROCHLORIDE 0.5 MG: 2 INJECTION, SOLUTION INTRAMUSCULAR; INTRAVENOUS; SUBCUTANEOUS at 11:09

## 2023-09-21 RX ADMIN — ONDANSETRON 4 MG: 2 INJECTION INTRAMUSCULAR; INTRAVENOUS at 09:09

## 2023-09-21 RX ADMIN — SUGAMMADEX 200 MG: 100 INJECTION, SOLUTION INTRAVENOUS at 09:09

## 2023-09-21 RX ADMIN — ACETAMINOPHEN 1000 MG: 500 TABLET ORAL at 06:09

## 2023-09-21 RX ADMIN — IBUPROFEN 600 MG: 600 TABLET, FILM COATED ORAL at 04:09

## 2023-09-21 RX ADMIN — ROCURONIUM BROMIDE 20 MG: 10 SOLUTION INTRAVENOUS at 09:09

## 2023-09-21 RX ADMIN — MIDAZOLAM HYDROCHLORIDE 2 MG: 1 INJECTION, SOLUTION INTRAMUSCULAR; INTRAVENOUS at 07:09

## 2023-09-21 RX ADMIN — ONDANSETRON 4 MG: 4 TABLET, ORALLY DISINTEGRATING ORAL at 06:09

## 2023-09-21 RX ADMIN — SODIUM CHLORIDE, SODIUM GLUCONATE, SODIUM ACETATE, POTASSIUM CHLORIDE AND MAGNESIUM CHLORIDE: 526; 502; 368; 37; 30 INJECTION, SOLUTION INTRAVENOUS at 07:09

## 2023-09-21 RX ADMIN — CEFAZOLIN 2 G: 330 INJECTION, POWDER, FOR SOLUTION INTRAMUSCULAR; INTRAVENOUS at 07:09

## 2023-09-21 NOTE — TRANSFER OF CARE
"Anesthesia Transfer of Care Note    Patient: Simi Reaves    Procedure(s) Performed: Procedure(s) (LRB):  ROBOTIC ABLATION OR EXCISION,ENDOMETRIOSIS (N/A)  ESOPHAGOGASTRODUODENOSCOPY (EGD)- DEVICE ASSISTED (N/A)  REPAIR-LAPAROSCOPIC (N/A)    Patient location: PACU    Anesthesia Type: general    Transport from OR: Transported from OR on room air with adequate spontaneous ventilation    Post pain: adequate analgesia    Post assessment: no apparent anesthetic complications and tolerated procedure well    Post vital signs: stable    Level of consciousness: awake and alert    Nausea/Vomiting: no nausea/vomiting    Complications: none    Transfer of care protocol was followed      Last vitals:   Visit Vitals  /75   Pulse 76   Temp 36.9 °C (98.5 °F) (Oral)   Resp 16   Ht 5' 6" (1.676 m)   Wt 45.3 kg (99 lb 13.9 oz)   SpO2 100%   Breastfeeding No   BMI 16.13 kg/m²     "

## 2023-09-21 NOTE — ANESTHESIA PROCEDURE NOTES
Intubation    Date/Time: 9/21/2023 7:30 AM    Performed by: Jose Reddy CRNA  Authorized by: Richar Zhu MD    Intubation:     Induction:  Intravenous    Intubated:  Postinduction    Mask Ventilation:  Easy mask    Attempts:  1    Attempted By:  ENT and other (see comments)    Method of Intubation:  Direct    Blade:  Owens 2    Laryngeal View Grade: Grade I - full view of cords      Difficult Airway Encountered?: No      Complications:  None    Airway Device:  Oral endotracheal tube    Airway Device Size:  7.0    Style/Cuff Inflation:  Cuffed (inflated to minimal occlusive pressure)    Inflation Amount (mL):  9    Tube secured:  22    Secured at:  The lips    Placement Verified By:  Capnometry    Complicating Factors:  None    Findings Post-Intubation:  BS equal bilateral and atraumatic/condition of teeth unchanged  Notes:      ENT Resident

## 2023-09-21 NOTE — ANESTHESIA PREPROCEDURE EVALUATION
"                                                                                                             09/20/2023  Simi Reaves is a 23 y.o., female who presents with Dysmenorrhea,Vaginal pain and Endometrial cyst..  Diagnosis:        Dysmenorrhea [N94.6]       Endometriotic cyst [N80.9]       Vaginal pain [R10.2]      The pt. comes to [facility] for the noted procedure under [facility].  Procedure:   ROBOTIC ABLATION OR EXCISION,ENDOMETRIOSIS - ROBOT X-EMOSIS        PMHx:  Other Medical History   Dysmenorrhea Endometriotic cyst   Vaginal pain Joint pain     Surgical History:  TONSILLECTOMY LASIK           Vital signs:  Pre Vitals     Current as of 09/21/23 0652  BP: 120/75 Pulse: 76   Resp: 16 SpO2: 100   Temp: 36.9 °C (98.5 °F)   Height: 5' 5.98" (1.676 m) (09/20/23) Weight: 45.3 kg (99 lb 13.9 oz) (09/21/23)   BMI: 16.13 IBW: 61 kg (134 lb 7.8 oz)   Last edited 09/21/23 0550 by NICK          Lab Data:          EKG:   N/A      Pre-op Assessment    I have reviewed the Patient Summary Reports.     I have reviewed the Nursing Notes. I have reviewed the NPO Status.   I have reviewed the Medications.     Review of Systems  Anesthesia Hx:  No problems with previous Anesthesia    Social:  Non-Smoker    Hematology/Oncology:  Hematology Normal   Oncology Normal     EENT/Dental:EENT/Dental Normal   Cardiovascular:  Cardiovascular Normal Exercise tolerance: good   Functional Capacity good / => 4 METS    Pulmonary:  Pulmonary Normal    Renal/:  Renal/ Normal     Hepatic/GI:  Hepatic/GI Normal    Musculoskeletal:  Musculoskeletal Normal    Neurological:  Neurology Normal    Endocrine:  Endocrine Normal    Dermatological:  Skin Normal    Psych:  Psychiatric Normal           Physical Exam  General: Alert, Oriented, Well nourished and Cooperative    Airway:  Mallampati: II   Mouth Opening: Normal  TM Distance: Normal  Tongue: Normal  Neck ROM: Normal ROM    Dental:  Intact    Chest/Lungs:  Clear to auscultation, Normal " Respiratory Rate    Heart:  Rate: Normal  Rhythm: Regular Rhythm        Anesthesia Plan  Type of Anesthesia, risks & benefits discussed:    Anesthesia Type: Gen ETT  Intra-op Monitoring Plan: Standard ASA Monitors  Post Op Pain Control Plan: IV/PO Opioids PRN  Induction:  IV and Inhalation  Airway Plan: Direct  Informed Consent: Informed consent signed with the Patient and all parties understand the risks and agree with anesthesia plan.  All questions answered. Patient consented to blood products? Yes  ASA Score: 2  Day of Surgery Review of History & Physical: H&P Update referred to the surgeon/provider.    Ready For Surgery From Anesthesia Perspective.     .

## 2023-09-21 NOTE — PLAN OF CARE
Problem: Adult Inpatient Plan of Care  Goal: Plan of Care Review  Outcome: Ongoing, Progressing  Goal: Patient-Specific Goal (Individualized)  Outcome: Ongoing, Progressing  Goal: Optimal Comfort and Wellbeing  Outcome: Ongoing, Progressing  Goal: Readiness for Transition of Care  Outcome: Ongoing, Progressing     Problem: Infection  Goal: Absence of Infection Signs and Symptoms  Outcome: Ongoing, Progressing

## 2023-09-21 NOTE — NURSING
Nurses Note -- 4 Eyes      9/21/2023   4:55 PM      Skin assessed during: Admit      [] No Altered Skin Integrity Present    []Prevention Measures Documented      [x] Yes- Altered Skin Integrity Present or Discovered   [] LDA Added if Not in Epic (Describe Wound)   [x] New Altered Skin Integrity was Present on Admit and Documented in LDA   [] Wound Image Taken    Wound Care Consulted? No    Attending Nurse:  Emma Burns RN/Staff Member:  Vasiliy Branch

## 2023-09-21 NOTE — OP NOTE
Date of operation:  September 21, 2023     This was an intraoperative consult    Please see Ob/gyn operative note     Operation:  Repair of gastric trocar injury    Upper endoscopy    Findings:  Through and through varus needle puncture through stomach, 8 mm trocar injury through anterior stomach, posterior stomach not injured by trocar    Anesthesia:  General endotracheal     Blood loss:  None     Surgeon:  Mitch POOL MD     Co-surgeon:Jose Ricks MD, please see his dictation for the main operative report for the robotic gynecologic operation     Indications:  This is a 23-year-old female who came in for an elective robotic operation for the treatment of suspected endometriosis.  During the early part of the operation, a trocar was placed through the anterior portion of the stomach, I was called to help    Details of operation:  When I came to the operating room, patient had already been intubated, there were 3 robotic trocars in place, after discussing with the gynecologist, it seemed that a gastric injury was suspected because of gastric over distention     Using the camera and the other 2 working ports, the stomach was examined, found what appeared to be the trocar injury in the midportion of the stomach just medial to the greater curve, immediately adjacent to this apparent trocar injury, was a small puncture likely from the varus needle    I used a laparoscopic needle  to place 3 Vicryl sutures and closed the trocar defect, 1 stitch was sufficient to close the Veress needle defect     I then advanced an entire scope through the patient's mouth down her esophagus into the stomach, the stomach was insufflated, I was concerned for a possible posterior gastric injury, I therefore withdrew the scope, the lesser sac was opened with the laparoscopic harmonic device, the entire posterior portion of the stomach was was observed, I saw miniscule puncture wound on the posterior portion of the stomach near the  lesser curve, this was closed with a single 3-0 Vicryl suture     Leak test revealed no leak     We were satisfied that there were no other gastric injuries     I then turned the patient over to Dr. Ricks so that he could complete his robotic operation , please see his dictation

## 2023-09-21 NOTE — PROGRESS NOTES
Pt parents present at bedside. Pt Hx and procedure discussed in detail. Post op orders reviewed. Pt attached to v/s machine and vitals WNL. Procedure site and IV assessed and intact. Everyone understands pt info with no further questions.

## 2023-09-21 NOTE — OP NOTE
[facility]                       [address] [id number]    PATIENT NAME:      MERCY HANKINS  YOB: 2000  CSN:               [id number]  MRN:               [id number]  ADMIT DATE:        09/21/2023 05:17:00  PHYSICIAN:         Jose Ricks MD                          OPERATIVE REPORT      DATE OF SURGERY:    09/21/2023 00:30:27    SURGEON:  Jose Ricks MD    PREOPERATIVE DIAGNOSES:    1. Chronic pelvic pain.  2. Suspected endometriosis.    POSTOPERATIVE DIAGNOSES:    1. Chronic pelvic pain.  2. Stage I endometriosis.  3. Stomach distension with repair of trocar injury.    PROCEDURES:    1. Robot-assisted laparoscopic excision of endometriosis.  2. Laparoscopic repair of stomach injury.  3. Upper endoscopy.    Please note procedures #2 and #3 were performed by Dr. Mitch Shipley, please see   his dictation for further details.    FINDINGS:  Initial trocar placement was found to be within a very distended   stomach cavity.  Further laparoscopic evaluation revealed a stomach that was   distended all the way deeply into the pelvis.  After orogastric tube was placed   to decompress the stomach, the stomach was noted to shrink to a normal size in   the left upper quadrant.  Laparoscopic inspection did reveal an injury to the   stomach consistent with the puncture with the Veress needle as well as the 8 mm   robotic trocar.  Further laparoscopic evaluation after laparoscopic suture   repair by Dr. Joya did confirm complete repair without any leakage of air.    Pelvic evaluation confirmed superficial endometriosis lesions within the left   posterior cul-de-sac, additionally there were endometriosis lesions primarily of   a clear type within both the left ovarian fossa and the right ovarian fossa.    There is a small clear endometriosis lesion also within the anterior cul-de-sac.    PROCEDURE IN  DETAIL:  After informed consent was verified, the patient was taken   to the operating room with IV fluid running.  2 g of Ancef was given prior to   start of the case.  The patient was administered general endotracheal anesthesia   and then prepped and draped in low lithotomy position with Servando stirrups.    After time-out was performed, a Samano catheter was placed into the bladder with   assistance of right angle retractor x2.  The cervix was identified and grasped   with a single-tooth tenaculum.  The cervix was gently sounded to approximately 6   cm.  The 6 cm Yazmin uterine manipulator was then assembled and sent into the   uterine cavity.  At this time, the tenaculum and right angle retractors were   removed.  Attention was then turned towards the abdomen.  The base of the   umbilicus was anesthetized with 0.5% Marcaine with epinephrine.  Approximately 8   mm vertical skin incision was made with a scalpel.  At this point, a Veress   needle was then placed through the umbilical incision at approximately 45-degree   angle directed towards the patient's pelvis.  After Veress needle was felt to   penetrate through the abdominal wall fascia, insufflation of normal saline was   performed, and upon withdrawal, there was no evidence of blood or bowel to   indicate a vascular or bowel injury.  Next, pneumoperitoneum was obtained with warm humidified CO2 with initial pressure set at 12 mmHg.  The primary trocar was then   placed through the umbilical incision in direct fashion.  Upon immediate   laparoscopic confirmation of entry, it was evident that this trocar was placed   not in the pelvis but actually within the stomach.  The trocar was then   immediately withdrawn and trocar was replaced and left lower quadrant skin   incision measuring aapproximately8 mm also anesthetized with 0.5% Marcaine with   epinephrine.  We did confirm that a massively distended stomach was filling the   pelvic cavity.  At this time, the  anesthetist was asked to place an orogastric   tube which was placed.  The stomach was noted to decompress.  Also, noteworthy   that stomach was inspected and found to have an area consistent with suspected   partial injury from the Veress needle and then the trocar.  The umbilical port   was then replaced under direct laparoscopic visualization through the umbilical   incision.  Finally, a 3rd trocar was placed in the right lower quadrant   symmetrically to the left lower quadrant port under direct laparoscopic   visualization.  At this time, the on-call bowel surgeon, Dr. Mitch Joya was   consulted and he was immediately available to scrub in and assess with the case.    His inspection confirmed my findings and he proceeded to do a primary repair   with laparoscopic suturing of the stomach injury.  He also performed an   endoscopy to confirm adequate repair and also to inspect the posterior wall of   the stomach.  After his portion of the surgery was completed, then I resumed   with the robot-assisted laparoscopy at which point the patient was placed in   Trendelenburg.  The robot was then docked with a camera placed at the umbilical   port.  The right lower quadrant port was assembled with hot benjamin and the left   lower quadrant port was assembled with Maryland bipolar forceps.  At this point,   surgeon was seated at the console.  The pelvic diagnostic laparoscopy was performed with   findings as described above.  Also noteworthy, there were superficial   endometriosis lesions noted on each of the ovaries.  The fallopian tubes were   completely normal.  The appendix was also noted to be normal.  All of the areas   of suspected endometriosis were carefully excised using the hot benjamin with   small amount of cutting current.  Care was taken to trace the course of the   ureters bilaterally prior to during and after the excisions of pelvic   peritoneum.  After all the peritoneal excisions were completed, the  superficial   lesions on each of the ovaries were cautery ablated with monopolar cautery current.  The   pelvis was then irrigated with warm Lactated Ringers and noted to be hemostatic.    At this time, the robot ws undocked.  A separate film solution was then placed   into the posterior cul-de-sac to prevent postoperative adhesions.    Additionally, approximately 700 mL of Lactated Ringer's was left in the pelvic   cavity at the close of the case to further assist with evacuation of a   pneumoperitoneum and therefore to minimize postoperative pain as well as to   minimize postoperative adhesion formation.  Pneumoperitoneum was then evacuated,   all 3 trocars were evacuated.  The skin incisions for each trocar were closed   with 4-0 Monocryl in subcuticular fashion.  The skin was then sealed with   Dermabond skin adhesive. The Yazmin uterine manipulator was removed at the close   of the case.  A Samano catheter was left and to be removed in the postanesthesia   care unit.  The patient tolerated the procedure, was extubated, and transferred   to the recovery room in awake and stable condition.  There was the complication   of the stomach injury with primary trocar placement as well as Veress needle   placement.  The injury was immediately identified and completely repaired.  There   was no expectation of any long-term sequelae with this minor complication and   the patient is expected to have a full recovery despite 1 overnight stay of   observation to monitor return of bowel function.        ______________________________  MD BRO Soriano/FAMS  DD:  09/21/2023  Time:  12:46PM  DT:  09/21/2023  Time:  02:45PM  Job #:  [id number]      OPERATIVE REPORT

## 2023-09-22 VITALS
WEIGHT: 100.06 LBS | DIASTOLIC BLOOD PRESSURE: 57 MMHG | HEART RATE: 63 BPM | SYSTOLIC BLOOD PRESSURE: 100 MMHG | RESPIRATION RATE: 16 BRPM | BODY MASS INDEX: 16.08 KG/M2 | OXYGEN SATURATION: 99 % | TEMPERATURE: 99 F | HEIGHT: 66 IN

## 2023-09-22 PROCEDURE — G0378 HOSPITAL OBSERVATION PER HR: HCPCS

## 2023-09-22 PROCEDURE — 25000003 PHARM REV CODE 250: Performed by: OBSTETRICS & GYNECOLOGY

## 2023-09-22 RX ADMIN — HYDROCODONE BITARTRATE AND ACETAMINOPHEN 1 TABLET: 5; 325 TABLET ORAL at 10:09

## 2023-09-22 RX ADMIN — HYDROCODONE BITARTRATE AND ACETAMINOPHEN 1 TABLET: 5; 325 TABLET ORAL at 05:09

## 2023-09-22 NOTE — ANESTHESIA POSTPROCEDURE EVALUATION
Anesthesia Post Evaluation    Patient: Simi Reaves    Procedure(s) Performed: Procedure(s) (LRB):  ROBOTIC ABLATION OR EXCISION,ENDOMETRIOSIS (N/A)  ESOPHAGOGASTRODUODENOSCOPY (EGD)- DEVICE ASSISTED (N/A)  REPAIR-LAPAROSCOPIC (N/A)    Final Anesthesia Type: general      Patient location during evaluation: PACU  Patient participation: Yes- Able to Participate  Level of consciousness: awake and alert and oriented  Post-procedure vital signs: reviewed and stable  Pain management: adequate  Airway patency: patent  MAINOR mitigation strategies: Verification of full reversal of neuromuscular block  PONV status at discharge: No PONV  Anesthetic complications: yes  Perioperative Events: other periop events (see comments)      Nida-operative Events Comments: Pt. Noted to have surgical complication requiring, General Surgery consultation. (See Surgery operative note)  Cardiovascular status: blood pressure returned to baseline and stable  Respiratory status: spontaneous ventilation and unassisted  Hydration status: euvolemic  Follow-up not needed.  Comments: [facility]          Vitals Value Taken Time   /61 09/21/23 1310   Temp 36.3 °C (97.3 °F) 09/21/23 1020   Pulse 86 09/21/23 1315   Resp 15 09/21/23 1315   SpO2 99 % 09/21/23 1315   Vitals shown include unvalidated device data.      Event Time   Out of Recovery 15:35:00         Pain/Alicai Score: Pain Rating Prior to Med Admin: 7 (9/22/2023  5:04 AM)  Pain Rating Post Med Admin: 0 (9/21/2023 10:11 PM)  Alicia Score: 9 (9/21/2023 12:30 PM)

## 2023-09-22 NOTE — DISCHARGE SUMMARY
[facility]                       [address] [address]    PATIENT NAME:       MERCY HANKINS  YOB: 2000  CSN:                [id number]   MRN:                [id number]  ADMIT DATE:         09/21/2023 05:17:00  PHYSICIAN:          Mynor Robles Jr, MD                          DISCHARGE SUMMARY    DATE OF DISCHARGE:  09/22/2023 00:00:00    DIAGNOSES:  Status post a robotic-assisted excision of endometriosis, repair of   an incidental injury to the stomach.    HOSPITAL COURSE:  The patient underwent the repair to the stomach and ablation   of the endometriosis by General Surgeon, Dr. Jose Ricks.  She was admitted to   the postoperative service, where she had an uneventful and speedy recovery.  She   tolerated a clear liquid diet, was advanced to a soft diet, which she tolerated   without difficulty.  She will be discharged home today.    CONDITION ON DISCHARGE:  Stable.    DIET:  Soft mechanical diet.    MEDICATIONS:    1. Percocet.  2. Motrin.    FOLLOWUP:  With Dr. Ricks and General Surgery as scheduled.        ______________________________  Mynor Robles Jr, MD    DJE/AQS  DD:  09/22/2023  Time:  11:14AM  DT:  09/22/2023  Time:  12:00PM  Job #:  [id number]      DISCHARGE SUMMARY

## 2023-09-22 NOTE — PROGRESS NOTES
Patient seen and examined   Patient tolerated full liquid diet yesterday   Patient has some incisional pain but otherwise appears well   A full plate a breakfast was at her bedside, she would not yet begun to eat this morning     Pulse is 63 beats per minute, blood pressure is 157, temperature is 37° centigrade, she is saturating 100% on room air  Patient is awake and alert x4, no acute distress   Normocephalic atraumatic pupils equal round round reactive to light and accommodation  There are no upper or lower motor or sensory deficits   Abdomen is soft, incisions covered with clean dry dressings, she has expected incisional pain but no rebound tenderness or guarding     Assessment plan:   23-year-old female status post robotic resection for endometriosis, repair of trocar injury to stomach, laparoscopic repair   Patient appears well this morning  She is tolerating diet   If she tolerates breakfast well, she may be discharged home from a general surgery perspective   For gynecologic instructions please refer to Dr. Ricks's notes,    I have instructed the patient to follow mechanical soft diet for 1 week   I have advised her to avoid alcohol, caffeine for 2 weeks, this is out of extreme caution, her gastric injury was about 8 mm, it was closed quite easily and I expect it to heal well    If patient develops severe abdominal pain, nausea vomiting fever or chills she should let her Ob/gyn no or I can be reached on my cell phone at [phone] or should go to an emergency room    I will give the patient her family my number so that I can be reached if they have any questions

## 2023-09-25 LAB — PSYCHE PATHOLOGY RESULT: NORMAL

## 2023-10-09 NOTE — PROGRESS NOTES
I was not physically present at induction of Anesthesia as Attestation indicates. Charge accordingly.

## 2023-10-09 NOTE — ANESTHESIA POSTPROCEDURE EVALUATION
Anesthesia Post Evaluation    Patient: Simi Reaves    Procedure(s) Performed: Procedure(s) (LRB):  ROBOTIC ABLATION OR EXCISION,ENDOMETRIOSIS (N/A)  ESOPHAGOGASTRODUODENOSCOPY (EGD)- DEVICE ASSISTED (N/A)  REPAIR-LAPAROSCOPIC (N/A)    Final Anesthesia Type: general      Patient location during evaluation: PACU  Patient participation: Yes- Able to Participate  Level of consciousness: awake and alert and oriented  Post-procedure vital signs: reviewed and stable  Pain management: adequate  Airway patency: patent  MAINOR mitigation strategies: Verification of full reversal of neuromuscular block  PONV status at discharge: No PONV  Anesthetic complications: no      Cardiovascular status: blood pressure returned to baseline and stable  Respiratory status: spontaneous ventilation and unassisted  Hydration status: euvolemic  Follow-up not needed.  Comments: [facility]          Vitals Value Taken Time   /61 09/21/23 1310   Temp 36.3 °C (97.3 °F) 09/21/23 1020   Pulse 93 09/21/23 1310   Resp 15 09/21/23 1310   SpO2 99 % 09/21/23 1310         Event Time   Out of Recovery 15:35:00         Pain/Alicia Score: No data recorded

## 2023-10-12 NOTE — ADDENDUM NOTE
Addendum  created 10/12/23 0842 by Jose Reddy, CAMILO    Attestation recorded in Intraprocedure, Intraprocedure Attestations filed

## (undated) DEVICE — ELECTRODE PATIENT RETURN DISP

## (undated) DEVICE — OBTURATOR BLADELESS 8MM XI CLR

## (undated) DEVICE — GLOVE PROTEXIS HYDROGEL SZ7.5

## (undated) DEVICE — KIT GYN LAPAROSCOPY LAFAYETTE

## (undated) DEVICE — SCISSOR CURVED ENDOPATH 5MM

## (undated) DEVICE — SHEARS HARMONIC CRVD TIP 36CM

## (undated) DEVICE — SUT MCRYL PLUS 4-0 PS2 27IN

## (undated) DEVICE — CHLORAPREP W TINT 26ML APPL

## (undated) DEVICE — SEAL UNIVERSAL 5MM-8MM XI

## (undated) DEVICE — KIT SURGICAL TURNOVER

## (undated) DEVICE — LEGGING SURG CONV 43X28IN

## (undated) DEVICE — TRAY CATH FOL SIL URIMTR 16FR

## (undated) DEVICE — ADHESIVE DERMABOND ADVANCED

## (undated) DEVICE — TRAY SKIN SCRUB WET PREMIUM

## (undated) DEVICE — DEVICE ENDO INSUFLOW GAS COND

## (undated) DEVICE — Device

## (undated) DEVICE — COVER MAYO STAND REINFRCD 30

## (undated) DEVICE — COVER TIP CURVED SCISSORS XI

## (undated) DEVICE — TIP RUMI WHITE DISP UMW676

## (undated) DEVICE — SPONGE LAP STRL 18X18IN

## (undated) DEVICE — SYR 10CC LUER LOCK

## (undated) DEVICE — GLOVE PROTEXIS LTX MICRO  7.5

## (undated) DEVICE — IRRIGATOR SUCTION W/TIP

## (undated) DEVICE — GLOVE PROTEXIS HYDROGEL SZ6

## (undated) DEVICE — GLOVE PROTEXIS BLUE LATEX 8

## (undated) DEVICE — SOL LAC RINGERS 1000ML INJ

## (undated) DEVICE — SOL CLEARIFY VISUALIZATION LAP

## (undated) DEVICE — SYR DISP LL 5CC

## (undated) DEVICE — DRAPE COLUMN DAVINCI XI

## (undated) DEVICE — PAD SANITARY HVY ABSRB UNSCNTD

## (undated) DEVICE — NDL HYPO REG 25G X 1 1/2

## (undated) DEVICE — DRAPE ARM DAVINCI XI

## (undated) DEVICE — SUT COATED VICRYL 3-0 1X27

## (undated) DEVICE — COVER TABLE HVY DTY 60X90IN

## (undated) DEVICE — PAD PINK TRENDELENBURG POS XL

## (undated) DEVICE — SYR 50ML CATH TIP